# Patient Record
Sex: FEMALE | Race: WHITE | Employment: OTHER | URBAN - NONMETROPOLITAN AREA
[De-identification: names, ages, dates, MRNs, and addresses within clinical notes are randomized per-mention and may not be internally consistent; named-entity substitution may affect disease eponyms.]

---

## 2017-01-09 PROBLEM — L73.9 FOLLICULITIS: Status: ACTIVE | Noted: 2017-01-09

## 2017-01-09 PROBLEM — M85.80 OSTEOPENIA: Status: ACTIVE | Noted: 2017-01-09

## 2017-01-09 PROBLEM — E78.5 HYPERLIPIDEMIA: Status: ACTIVE | Noted: 2017-01-09

## 2017-07-10 PROBLEM — D72.819 LEUKOPENIA: Status: ACTIVE | Noted: 2017-07-10

## 2017-07-10 PROBLEM — I10 ESSENTIAL HYPERTENSION: Status: ACTIVE | Noted: 2017-07-10

## 2017-07-10 PROBLEM — Z00.00 ROUTINE GENERAL MEDICAL EXAMINATION AT A HEALTH CARE FACILITY: Status: ACTIVE | Noted: 2017-07-10

## 2018-01-09 ENCOUNTER — OFFICE VISIT (OUTPATIENT)
Dept: FAMILY MEDICINE CLINIC | Age: 78
End: 2018-01-09
Payer: MEDICARE

## 2018-01-09 VITALS
WEIGHT: 169 LBS | SYSTOLIC BLOOD PRESSURE: 134 MMHG | BODY MASS INDEX: 28.16 KG/M2 | DIASTOLIC BLOOD PRESSURE: 80 MMHG | HEIGHT: 65 IN

## 2018-01-09 DIAGNOSIS — E11.9 CONTROLLED TYPE 2 DIABETES MELLITUS WITHOUT COMPLICATION, WITHOUT LONG-TERM CURRENT USE OF INSULIN (HCC): Primary | ICD-10-CM

## 2018-01-09 DIAGNOSIS — I10 HYPERTENSION, ESSENTIAL: ICD-10-CM

## 2018-01-09 DIAGNOSIS — M85.80 OSTEOPENIA, UNSPECIFIED LOCATION: ICD-10-CM

## 2018-01-09 DIAGNOSIS — E78.49 OTHER HYPERLIPIDEMIA: ICD-10-CM

## 2018-01-09 PROCEDURE — 3044F HG A1C LEVEL LT 7.0%: CPT | Performed by: FAMILY MEDICINE

## 2018-01-09 PROCEDURE — G8417 CALC BMI ABV UP PARAM F/U: HCPCS | Performed by: FAMILY MEDICINE

## 2018-01-09 PROCEDURE — G8427 DOCREV CUR MEDS BY ELIG CLIN: HCPCS | Performed by: FAMILY MEDICINE

## 2018-01-09 PROCEDURE — 99214 OFFICE O/P EST MOD 30 MIN: CPT | Performed by: FAMILY MEDICINE

## 2018-01-09 PROCEDURE — 3017F COLORECTAL CA SCREEN DOC REV: CPT | Performed by: FAMILY MEDICINE

## 2018-01-09 PROCEDURE — 4040F PNEUMOC VAC/ADMIN/RCVD: CPT | Performed by: FAMILY MEDICINE

## 2018-01-09 PROCEDURE — G8399 PT W/DXA RESULTS DOCUMENT: HCPCS | Performed by: FAMILY MEDICINE

## 2018-01-09 PROCEDURE — 1036F TOBACCO NON-USER: CPT | Performed by: FAMILY MEDICINE

## 2018-01-09 PROCEDURE — G8484 FLU IMMUNIZE NO ADMIN: HCPCS | Performed by: FAMILY MEDICINE

## 2018-01-09 PROCEDURE — 1123F ACP DISCUSS/DSCN MKR DOCD: CPT | Performed by: FAMILY MEDICINE

## 2018-01-09 PROCEDURE — 1090F PRES/ABSN URINE INCON ASSESS: CPT | Performed by: FAMILY MEDICINE

## 2018-01-09 RX ORDER — LOSARTAN POTASSIUM AND HYDROCHLOROTHIAZIDE 12.5; 5 MG/1; MG/1
1 TABLET ORAL DAILY
Qty: 90 TABLET | Refills: 3 | Status: SHIPPED | OUTPATIENT
Start: 2018-01-09 | End: 2019-01-07 | Stop reason: SDUPTHER

## 2018-01-09 RX ORDER — CLOTRIMAZOLE AND BETAMETHASONE DIPROPIONATE 10; .64 MG/G; MG/G
CREAM TOPICAL
Qty: 30 G | Refills: 1 | Status: SHIPPED | OUTPATIENT
Start: 2018-01-09 | End: 2019-01-07 | Stop reason: SDUPTHER

## 2018-01-09 RX ORDER — ROSUVASTATIN CALCIUM 10 MG/1
TABLET, COATED ORAL
Qty: 12 TABLET | Refills: 3 | Status: SHIPPED | OUTPATIENT
Start: 2018-01-09 | End: 2018-11-07 | Stop reason: SDUPTHER

## 2018-01-09 NOTE — PROGRESS NOTES
equal, round reactive to light and accommodation, wearing glasses  Ears: normal canal and TM's. Nose: nares patent, no lesions. Oral Cavity: mucosa moist.  Throat: clear. Neck/Thyroid: neck supple, full range of motion, no cervical lymphadenopathy, no thyromegaly or carotid bruits. Skin: warm and dry. No suspicious lesions. Heart: regular rate and rhythm. No murmurs. S1, S2 normal, no gallops, rate 75  Lungs: clear to auscultation bilaterally. Abdomen: bowel sounds present, soft, nontender, nondistended, no masses or organomegaly. Musculoskeletal: normal, full range of motion in knees and hips, no swelling or tenderness. Extremities: no cyanosis or edema. Peripheral Pulses: 2+ throughout, symetric. Neurologic: nonfocal, motor strength normal upper and lower extremities, sensory exam intact. Psych: normal affect, speech fluent. Diabetic foot check: monofilament testing: normal                                       Vibratory testing: normal    ASSESSMENT:  1. Controlled type 2 diabetes mellitus without complication, without long-term current use of insulin ([facility])   DIABETES FOOT EXAM    Basic Metabolic Panel    Hemoglobin A1C    Microalbumin, Ur    Stable A1C control   2. Hypertension, essential  ALT    AST    Basic Metabolic Panel    CBC   3. Other hyperlipidemia  ALT    AST    Basic Metabolic Panel    CBC    CRP,High Sensitivity    Lipid Panel   4. Osteopenia, unspecified location  Vitamin D 25 Hydroxy       PLAN:  We discussed the diagnosis of diabetes on her problem list and whether or not this is fully accurate. She gets occasional spikes, mostly nocturnal, but most of the time stays well controlled. As long as she keeps her weight controlled and watches her carbs she should be ok. We will continue to watch this. 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 She sees Madi Holbrook and he has been working with her.     Orders Placed This Encounter   Procedures    ALT Standing Status:   Future     Standing Expiration Date:   1/9/2019    AST     Standing Status:   Future     Standing Expiration Date:   1/9/2019    Basic Metabolic Panel     Standing Status:   Future     Standing Expiration Date:   1/9/2019    CBC     Standing Status:   Future     Standing Expiration Date:   1/9/2019    CRP,High Sensitivity     Standing Status:   Future     Standing Expiration Date:   1/9/2019    Hemoglobin A1C     Standing Status:   Future     Standing Expiration Date:   1/9/2019    Lipid Panel     Standing Status:   Future     Standing Expiration Date:   1/9/2019     Order Specific Question:   Is Patient Fasting?/# of Hours     Answer:   no fasting required    Vitamin D 25 Hydroxy     Standing Status:   Future     Standing Expiration Date:   1/9/2019    Microalbumin, Ur     Standing Status:   Future     Standing Expiration Date:   1/9/2019     DIABETES FOOT EXAM     Orders Placed This Encounter   Medications    losartan-hydrochlorothiazide (HYZAAR) 50-12.5 MG per tablet     Sig: Take 1 tablet by mouth daily     Dispense:  90 tablet     Refill:  3    rosuvastatin (CRESTOR) 10 MG tablet     Sig: Once a week     Dispense:  12 tablet     Refill:  3    clotrimazole-betamethasone (LOTRISONE) 1-0.05 % cream     Sig: Apply topically 2 times daily. Dispense:  30 g     Refill:  1       Scribed by: DOUG Dave CMA    1. Controlled type 2 diabetes mellitus without complication, without long-term current use of insulin ([facility])  Stable A1C control  -  DIABETES FOOT EXAM  - Basic Metabolic Panel;  Future  - Hemoglobin A1C; Future  - Microalbumin, Ur; Future

## 2018-03-07 ENCOUNTER — OFFICE VISIT (OUTPATIENT)
Dept: FAMILY MEDICINE CLINIC | Age: 78
End: 2018-03-07
Payer: MEDICARE

## 2018-03-07 VITALS
WEIGHT: 171 LBS | BODY MASS INDEX: 28.49 KG/M2 | HEIGHT: 65 IN | SYSTOLIC BLOOD PRESSURE: 136 MMHG | DIASTOLIC BLOOD PRESSURE: 88 MMHG

## 2018-03-07 DIAGNOSIS — J30.9 ALLERGIC RHINITIS, UNSPECIFIED CHRONICITY, UNSPECIFIED SEASONALITY, UNSPECIFIED TRIGGER: Primary | ICD-10-CM

## 2018-03-07 DIAGNOSIS — J01.90 ACUTE SINUSITIS, RECURRENCE NOT SPECIFIED, UNSPECIFIED LOCATION: ICD-10-CM

## 2018-03-07 PROCEDURE — 99213 OFFICE O/P EST LOW 20 MIN: CPT | Performed by: FAMILY MEDICINE

## 2018-03-07 PROCEDURE — 3017F COLORECTAL CA SCREEN DOC REV: CPT | Performed by: FAMILY MEDICINE

## 2018-03-07 PROCEDURE — G8417 CALC BMI ABV UP PARAM F/U: HCPCS | Performed by: FAMILY MEDICINE

## 2018-03-07 PROCEDURE — 1123F ACP DISCUSS/DSCN MKR DOCD: CPT | Performed by: FAMILY MEDICINE

## 2018-03-07 PROCEDURE — 4040F PNEUMOC VAC/ADMIN/RCVD: CPT | Performed by: FAMILY MEDICINE

## 2018-03-07 PROCEDURE — G8484 FLU IMMUNIZE NO ADMIN: HCPCS | Performed by: FAMILY MEDICINE

## 2018-03-07 PROCEDURE — G8399 PT W/DXA RESULTS DOCUMENT: HCPCS | Performed by: FAMILY MEDICINE

## 2018-03-07 PROCEDURE — 1036F TOBACCO NON-USER: CPT | Performed by: FAMILY MEDICINE

## 2018-03-07 PROCEDURE — G8427 DOCREV CUR MEDS BY ELIG CLIN: HCPCS | Performed by: FAMILY MEDICINE

## 2018-03-07 PROCEDURE — 1090F PRES/ABSN URINE INCON ASSESS: CPT | Performed by: FAMILY MEDICINE

## 2018-03-07 RX ORDER — PREDNISONE 20 MG/1
20 TABLET ORAL 2 TIMES DAILY
Qty: 10 TABLET | Refills: 0 | Status: SHIPPED | OUTPATIENT
Start: 2018-03-07 | End: 2018-03-12

## 2018-03-07 RX ORDER — AMOXICILLIN AND CLAVULANATE POTASSIUM 875; 125 MG/1; MG/1
1 TABLET, FILM COATED ORAL EVERY 12 HOURS
Qty: 20 TABLET | Refills: 0 | Status: SHIPPED | OUTPATIENT
Start: 2018-03-07 | End: 2018-03-17

## 2018-03-07 ASSESSMENT — PATIENT HEALTH QUESTIONNAIRE - PHQ9
SUM OF ALL RESPONSES TO PHQ9 QUESTIONS 1 & 2: 0
2. FEELING DOWN, DEPRESSED OR HOPELESS: 0
1. LITTLE INTEREST OR PLEASURE IN DOING THINGS: 0
SUM OF ALL RESPONSES TO PHQ QUESTIONS 1-9: 0

## 2018-03-07 NOTE — PATIENT INSTRUCTIONS
PLAN:  I will treat her with Augmentin and she should get OTC Flonase  I will also send a Rx for Prednisone 20 mg BID x 5 days to use only if she doesn't improve in a few days  If she ends up needing to take it she should call and let me know next week

## 2018-03-07 NOTE — PROGRESS NOTES
equal, round reactive to light and accommodation. Ears: TMs dull bilaterally. Nose: pale mucoid  Oral Cavity: mucosa moist.  Throat: clear. Neck/Thyroid: neck supple, full range of motion, no cervical lymphadenopathy, no thyromegaly or carotid bruits. Skin: warm and dry. No suspicious lesions. Heart: regular rate and rhythm. No murmurs. S1, S2 normal, no gallops. Lungs: clear to auscultation bilaterally. Abdomen: bowel sounds present, soft, nontender, nondistended, no masses or organomegaly. Musculoskeletal: normal, full range of motion in knees and hips, no swelling or tenderness. Extremities: no cyanosis or edema. Peripheral Pulses: 2+ throughout, symetric. Neurologic: nonfocal, motor strength normal upper and lower extremities, sensory exam intact. Psych: normal affect, speech fluent. ASSESSMENT:  1. Allergic rhinitis, unspecified chronicity, unspecified seasonality, unspecified trigger     2. Acute sinusitis, recurrence not specified, unspecified location           PLAN:  I will treat her with Augmentin and she should get OTC Flonase  I will also send a Rx for Prednisone 20 mg BID x 5 days to use only if she doesn't improve in a few days  If she ends up needing to take it she should call and let me know next week    No orders of the defined types were placed in this encounter. Orders Placed This Encounter   Medications    amoxicillin-clavulanate (AUGMENTIN) 875-125 MG per tablet     Sig: Take 1 tablet by mouth every 12 hours for 10 days     Dispense:  20 tablet     Refill:  0    predniSONE (DELTASONE) 20 MG tablet     Sig: Take 1 tablet by mouth 2 times daily for 5 days     Dispense:  10 tablet     Refill:  0       The documentation recorded by the scribe accurately reflects the services I personally performed and the decisions made by me.  Glady Schwab, MD

## 2018-07-02 LAB
ABSOLUTE BASO #: 0 K/UL (ref 0–0.1)
ABSOLUTE EOS #: 0.1 K/UL (ref 0.1–0.4)
ABSOLUTE LYMPH #: 1.8 K/UL (ref 0.8–5.2)
ABSOLUTE MONO #: 0.3 K/UL (ref 0.1–0.9)
ABSOLUTE NEUT #: 2.1 K/UL (ref 1.3–9.1)
ALT SERPL-CCNC: 20 U/L (ref 5–40)
ANION GAP SERPL CALCULATED.3IONS-SCNC: 13 MEQ/L (ref 10–19)
AST SERPL-CCNC: 17 U/L (ref 9–40)
BASOPHILS RELATIVE PERCENT: 0.5 %
BUN BLDV-MCNC: 16 MG/DL (ref 8–23)
CALCIUM SERPL-MCNC: 9.6 MG/DL (ref 8.5–10.5)
CHLORIDE BLD-SCNC: 102 MEQ/L (ref 95–107)
CHOLESTEROL/HDL RATIO: 4.4
CHOLESTEROL: 213 MG/DL
CO2: 27 MEQ/L (ref 19–31)
CREAT SERPL-MCNC: 0.8 MG/DL (ref 0.6–1.3)
EGFR AFRICAN AMERICAN: 83.6 ML/MIN/1.73 M2
EGFR IF NONAFRICAN AMERICAN: 72.1 ML/MIN/1.73 M2
EOSINOPHILS RELATIVE PERCENT: 1.6 %
GLUCOSE: 139 MG/DL (ref 70–99)
HCT VFR BLD CALC: 40.1 % (ref 36–48)
HDLC SERPL-MCNC: 48 MG/DL
HEMOGLOBIN: 13.8 G/DL (ref 12–16)
HIGH SENSITIVE C-REACTIVE PROTEIN: 0.61 MG/L
LDL CHOLESTEROL CALCULATED: 121 MG/DL
LDL/HDL RATIO: 2.5
LYMPHOCYTE %: 41.6 %
MCH RBC QN AUTO: 30.6 PG (ref 27–34)
MCHC RBC AUTO-ENTMCNC: 34.4 G/DL (ref 31–36)
MCV RBC AUTO: 88.9 FL (ref 80–100)
MONOCYTES # BLD: 7.2 %
NEUTROPHILS RELATIVE PERCENT: 48.9 %
PDW BLD-RTO: 13.2 % (ref 10.8–14.8)
PLATELETS: 211 K/UL (ref 150–450)
POTASSIUM SERPL-SCNC: 3.9 MEQ/L (ref 3.5–5.4)
RBC: 4.51 M/UL (ref 4–5.5)
SODIUM BLD-SCNC: 142 MEQ/L (ref 135–146)
TRIGL SERPL-MCNC: 222 MG/DL
VLDLC SERPL CALC-MCNC: 44 MG/DL
WBC: 4.3 K/UL (ref 3.7–10.8)

## 2018-07-03 LAB
AVERAGE GLUCOSE: 123 MG/DL (ref 66–114)
CREATINE, URINE: 182.7 MG/DL (ref 28–217)
HBA1C MFR BLD: 5.9 % (ref 4.2–5.8)
MICROALBUMIN/CREAT 24H UR: <12 MG/DL
MICROALBUMIN/CREAT UR-RTO: NORMAL MG/G

## 2018-07-04 LAB — VITAMIN D 25-HYDROXY: 38 NG/ML

## 2018-07-09 ENCOUNTER — OFFICE VISIT (OUTPATIENT)
Dept: FAMILY MEDICINE CLINIC | Age: 78
End: 2018-07-09
Payer: MEDICARE

## 2018-07-09 VITALS
DIASTOLIC BLOOD PRESSURE: 76 MMHG | WEIGHT: 172.6 LBS | HEIGHT: 65 IN | BODY MASS INDEX: 28.76 KG/M2 | SYSTOLIC BLOOD PRESSURE: 138 MMHG

## 2018-07-09 DIAGNOSIS — H60.509 ACUTE OTITIS EXTERNA, UNSPECIFIED LATERALITY, UNSPECIFIED TYPE: ICD-10-CM

## 2018-07-09 DIAGNOSIS — E11.9 CONTROLLED TYPE 2 DIABETES MELLITUS WITHOUT COMPLICATION, WITHOUT LONG-TERM CURRENT USE OF INSULIN (HCC): Primary | ICD-10-CM

## 2018-07-09 DIAGNOSIS — Z12.31 SCREENING MAMMOGRAM, ENCOUNTER FOR: ICD-10-CM

## 2018-07-09 DIAGNOSIS — E78.49 OTHER HYPERLIPIDEMIA: ICD-10-CM

## 2018-07-09 DIAGNOSIS — I10 HYPERTENSION, ESSENTIAL: ICD-10-CM

## 2018-07-09 DIAGNOSIS — M85.80 OSTEOPENIA, UNSPECIFIED LOCATION: ICD-10-CM

## 2018-07-09 PROCEDURE — 3017F COLORECTAL CA SCREEN DOC REV: CPT | Performed by: FAMILY MEDICINE

## 2018-07-09 PROCEDURE — 99214 OFFICE O/P EST MOD 30 MIN: CPT | Performed by: FAMILY MEDICINE

## 2018-07-09 PROCEDURE — G8399 PT W/DXA RESULTS DOCUMENT: HCPCS | Performed by: FAMILY MEDICINE

## 2018-07-09 PROCEDURE — 1036F TOBACCO NON-USER: CPT | Performed by: FAMILY MEDICINE

## 2018-07-09 PROCEDURE — 3044F HG A1C LEVEL LT 7.0%: CPT | Performed by: FAMILY MEDICINE

## 2018-07-09 PROCEDURE — 1090F PRES/ABSN URINE INCON ASSESS: CPT | Performed by: FAMILY MEDICINE

## 2018-07-09 PROCEDURE — 1101F PT FALLS ASSESS-DOCD LE1/YR: CPT | Performed by: FAMILY MEDICINE

## 2018-07-09 PROCEDURE — 2022F DILAT RTA XM EVC RTNOPTHY: CPT | Performed by: FAMILY MEDICINE

## 2018-07-09 PROCEDURE — 4130F TOPICAL PREP RX AOE: CPT | Performed by: FAMILY MEDICINE

## 2018-07-09 PROCEDURE — 4040F PNEUMOC VAC/ADMIN/RCVD: CPT | Performed by: FAMILY MEDICINE

## 2018-07-09 PROCEDURE — 1123F ACP DISCUSS/DSCN MKR DOCD: CPT | Performed by: FAMILY MEDICINE

## 2018-07-09 PROCEDURE — G8427 DOCREV CUR MEDS BY ELIG CLIN: HCPCS | Performed by: FAMILY MEDICINE

## 2018-07-09 PROCEDURE — G8417 CALC BMI ABV UP PARAM F/U: HCPCS | Performed by: FAMILY MEDICINE

## 2018-07-09 RX ORDER — AMLODIPINE BESYLATE 5 MG/1
5 TABLET ORAL DAILY
Qty: 90 TABLET | Refills: 3 | Status: SHIPPED | OUTPATIENT
Start: 2018-07-09 | End: 2019-07-08 | Stop reason: SDUPTHER

## 2018-07-09 NOTE — PROGRESS NOTES
Remy LIND RMA, am scribing for and in the presence of Dr. Satish Nieto. 07/09/18 8:40 am [address] [facility]  [phone]  Dept: [phone]    Ivette Ro is a 78 y.o. female here for 6 Month Follow-Up; Hypertension; Hyperlipidemia; and Diabetes      HPI:  HYPERTENSION:  Medication compliance: compliant all of the time. Medication Therapy: amlodipine, losartan-HCTZ, Metoprolol  She is exercising, for 3 hours per week. She is adherent to a low-sodium diet. Blood pressure is being monitored at home, average readings are 120-130/70-80. Patient reports that She has limited alcohol intake. Does the patient have sleep apnea? She has not been tested  The patient's most recent LDL is below 190, which was checked on 07/02/18. HYPERLIPIDEMIA:  Medication compliance: compliant all of the time. Medication Therapy: rosuvastatin (Crestor) and Aspirin, fish oil  Patient is  following a low fat, low cholesterol diet. She is  exercising regularly, for 3 hours per week.     DIABETES MELLITUS  Medication compliance:  n/a  Diabetic diet compliance:  compliant all of the time  Current exercise: yoga and walking  How long are you exercising during the week? 3+  Frequency of testing: none  Any episodes of hypoglycemia? no  Eye exam current (within one year): yes, \"about 6 months ago\" Dr. Kenyetta Gil  Diabetic foot check in the past year Yes, 01/2018   reports that she quit smoking about 32 years ago. She has never used smokeless tobacco.     Prior to Admission medications    Medication Sig Start Date End Date Taking?  Authorizing Provider   amLODIPine (NORVASC) 5 MG tablet Take 1 tablet by mouth daily 7/9/18  Yes Satish Nieto MD   metoprolol tartrate (LOPRESSOR) 25 MG tablet Take 1 tablet by mouth daily 7/9/18  Yes Satish Nieto MD   losartan-hydrochlorothiazide [facility]) 50-12.5 MG per tablet Take 1 tablet by mouth daily 1/9/18  Yes Satish Nieto MD Encounters:   07/09/18 138/76   03/07/18 136/88   01/09/18 134/80       General Appearance: in no acute distress, well developed, well nourished. Eyes: pupils equal, round reactive to light and accommodation. Wearing glasses  Ears: normal canal and TM's. Nose: nares patent, no lesions. Oral Cavity: mucosa moist.  Throat: clear. Neck/Thyroid: neck supple, full range of motion, no cervical lymphadenopathy, no thyromegaly or carotid bruits. Skin: warm and dry. No suspicious lesions. Heart: regular rate and rhythm. No murmurs. S1, S2 normal, no gallops. Rate: 70   Lungs: clear to auscultation bilaterally. Abdomen: bowel sounds present, soft, nontender, nondistended, no masses or organomegaly. Musculoskeletal: normal, full range of motion in knees and hips, no swelling or tenderness. Extremities: no cyanosis or edema. Peripheral Pulses: 2+ throughout, symetric. Neurologic: nonfocal, motor strength normal upper and lower extremities, sensory exam intact. Psych: normal affect, speech fluent. ASSESSMENT:   Diagnosis Orders   1. Controlled type 2 diabetes mellitus without complication, without long-term current use of insulin (HCC)  Hemoglobin A1C   2. Hypertension, essential     3. Osteopenia, unspecified location     4. Other hyperlipidemia  ALT    AST    Basic Metabolic Panel    CBC    CRP,High Sensitivity   5. Screening mammogram, encounter for  DAVID DIGITAL SCREEN W CAD BILATERAL   6. Acute otitis externa, unspecified laterality, unspecified type      recurrent       PLAN:  Her labs are reviewed. Her hemoglobin A1C level has creeped up slightly from where it was to 5.9. I encourage her to continue to watch her carbohydrate intake in order prevent this from creeping up over 6. I am pleased with all other lab results. She continues to stay active, golfing. I commend her on this We discuss her level of fatigue with walking the course and how this has changed over the years as she has gotten older.  I

## 2018-08-06 ENCOUNTER — HOSPITAL ENCOUNTER (OUTPATIENT)
Dept: WOMENS IMAGING | Age: 78
Discharge: HOME | End: 2018-08-08
Payer: MEDICARE

## 2018-08-06 DIAGNOSIS — Z12.31 SCREENING MAMMOGRAM, ENCOUNTER FOR: ICD-10-CM

## 2018-08-06 PROCEDURE — 77067 SCR MAMMO BI INCL CAD: CPT

## 2018-08-08 PROBLEM — Z12.31 SCREENING MAMMOGRAM, ENCOUNTER FOR: Status: RESOLVED | Noted: 2018-07-09 | Resolved: 2018-08-08

## 2018-08-10 ENCOUNTER — TELEPHONE (OUTPATIENT)
Dept: FAMILY MEDICINE CLINIC | Age: 78
End: 2018-08-10

## 2018-08-10 DIAGNOSIS — R92.8 ABNORMAL MAMMOGRAM OF RIGHT BREAST: Primary | ICD-10-CM

## 2018-08-10 NOTE — TELEPHONE ENCOUNTER
Is it ok to order diagnostic mammo and ultrasound d/t abnormal mammo?   See scanned result in     Health Maintenance   Topic Date Due    Diabetic retinal exam  09/08/1950    DTaP/Tdap/Td vaccine (1 - Tdap) 09/08/1961    Shingles Vaccine (1 of 2 - 2 Dose Series) 09/08/1992    Flu vaccine (1) 09/01/2018    Diabetic foot exam  01/09/2019    A1C test (Diabetic or Prediabetic)  07/02/2019    Lipid screen  07/02/2019    Potassium monitoring  07/02/2019    Creatinine monitoring  07/02/2019    Colon cancer screen colonoscopy  02/07/2023    DEXA (modify frequency per FRAX score)  Completed    Pneumococcal low/med risk  Completed             (applicable per patient's age: Cancer Screenings, Depression Screening, Fall Risk Screening, Immunizations)    Hemoglobin A1C (%)   Date Value   07/02/2018 5.9 (H)   01/04/2018 5.8   07/05/2017 5.7     LDL Cholesterol (mg/dL)   Date Value   07/06/2016 122     LDL Calculated (mg/dL)   Date Value   07/02/2018 121     AST (U/L)   Date Value   07/02/2018 17     ALT (U/L)   Date Value   07/02/2018 20     BUN (mg/dL)   Date Value   07/02/2018 16      (goal A1C is < 7)   (goal LDL is <100) need 30-50% reduction from baseline     BP Readings from Last 3 Encounters:   07/09/18 138/76   03/07/18 136/88   01/09/18 134/80    (goal /80)      All Future Testing planned in [facility]:  Lab Frequency Next Occurrence   ALT Once 07/02/2018   AST Once 90/26/0766   Basic Metabolic Panel Once 13/86/8891   CBC Once 07/02/2018   CRP,High Sensitivity Once 07/02/2018   Hemoglobin A1C Once 07/02/2018   Lipid Panel Once 07/02/2018   Vitamin D 25 Hydroxy Once 07/02/2018   Microalbumin, Ur Once 07/02/2018   ALT Once 12/31/2018   AST Once 68/24/2207   Basic Metabolic Panel Once 10/99/6988   CBC Once 12/31/2018   CRP,High Sensitivity Once 12/31/2018   Hemoglobin A1C Once 12/31/2018       Next Visit Date:  Future Appointments  Date Time Provider Raya Chan   1/7/2019 8:30 AM [facility] Mat[facility]            Patient Active Problem List:     Hypertension, essential     DM II (diabetes mellitus, type II), controlled (HCC)     Allergic rhinitis     Osteopenia     Hyperlipidemia     Folliculitis     Essential hypertension     Leukopenia     Routine general medical examination at a health care facility     Acute sinusitis     Acute otitis externa

## 2018-08-20 ENCOUNTER — HOSPITAL ENCOUNTER (OUTPATIENT)
Dept: ULTRASOUND IMAGING | Age: 78
Discharge: HOME | End: 2018-08-22
Payer: MEDICARE

## 2018-08-20 ENCOUNTER — HOSPITAL ENCOUNTER (OUTPATIENT)
Dept: WOMENS IMAGING | Age: 78
Discharge: HOME | End: 2018-08-22
Payer: MEDICARE

## 2018-08-20 DIAGNOSIS — R92.8 ABNORMAL MAMMOGRAM OF RIGHT BREAST: ICD-10-CM

## 2018-08-20 PROCEDURE — 77065 DX MAMMO INCL CAD UNI: CPT

## 2018-08-20 PROCEDURE — 76641 ULTRASOUND BREAST COMPLETE: CPT

## 2018-09-26 PROBLEM — Z00.00 ROUTINE GENERAL MEDICAL EXAMINATION AT A HEALTH CARE FACILITY: Status: RESOLVED | Noted: 2017-07-10 | Resolved: 2018-09-26

## 2018-11-07 RX ORDER — ROSUVASTATIN CALCIUM 10 MG/1
TABLET, COATED ORAL
Qty: 13 TABLET | Refills: 3 | Status: SHIPPED | OUTPATIENT
Start: 2018-11-07 | End: 2019-07-08 | Stop reason: SDUPTHER

## 2019-01-02 LAB
ABSOLUTE BASO #: 0 K/UL (ref 0–0.1)
ABSOLUTE EOS #: 0.1 K/UL (ref 0.1–0.4)
ABSOLUTE LYMPH #: 2.3 K/UL (ref 0.8–5.2)
ABSOLUTE MONO #: 0.4 K/UL (ref 0.1–0.9)
ABSOLUTE NEUT #: 1.5 K/UL (ref 1.3–9.1)
BASOPHILS RELATIVE PERCENT: 0.7 %
EOSINOPHILS RELATIVE PERCENT: 1.9 %
HCT VFR BLD CALC: 39.7 % (ref 36–48)
HEMOGLOBIN: 13.6 G/DL (ref 12–16)
LYMPHOCYTE %: 53.3 %
MCH RBC QN AUTO: 30.5 PG (ref 27–34)
MCHC RBC AUTO-ENTMCNC: 34.3 G/DL (ref 31–36)
MCV RBC AUTO: 89 FL (ref 80–100)
MONOCYTES # BLD: 8.8 %
NEUTROPHILS RELATIVE PERCENT: 35.3 %
PDW BLD-RTO: 13.3 % (ref 10.8–14.8)
PLATELETS: 187 K/UL (ref 150–450)
RBC: 4.46 M/UL (ref 4–5.5)
WBC: 4.2 K/UL (ref 3.7–10.8)

## 2019-01-03 LAB
ALT SERPL-CCNC: 21 U/L (ref 5–40)
ANION GAP SERPL CALCULATED.3IONS-SCNC: 12 MEQ/L (ref 10–19)
AST SERPL-CCNC: 21 U/L (ref 9–40)
AVERAGE GLUCOSE: 117 MG/DL (ref 66–114)
BUN BLDV-MCNC: 17 MG/DL (ref 8–23)
CALCIUM SERPL-MCNC: 9.9 MG/DL (ref 8.5–10.5)
CHLORIDE BLD-SCNC: 103 MEQ/L (ref 95–107)
CO2: 28 MEQ/L (ref 19–31)
CREAT SERPL-MCNC: 0.9 MG/DL (ref 0.6–1.3)
EGFR AFRICAN AMERICAN: 72 ML/MIN/1.73 M2
EGFR IF NONAFRICAN AMERICAN: 62.1 ML/MIN/1.73 M2
GLUCOSE: 116 MG/DL (ref 70–99)
HBA1C MFR BLD: 5.7 %
HIGH SENSITIVE C-REACTIVE PROTEIN: 1.74 MG/L
POTASSIUM SERPL-SCNC: 4 MEQ/L (ref 3.5–5.4)
SODIUM BLD-SCNC: 143 MEQ/L (ref 135–146)

## 2019-01-07 ENCOUNTER — OFFICE VISIT (OUTPATIENT)
Dept: FAMILY MEDICINE CLINIC | Age: 79
End: 2019-01-07
Payer: MEDICARE

## 2019-01-07 VITALS
SYSTOLIC BLOOD PRESSURE: 136 MMHG | DIASTOLIC BLOOD PRESSURE: 78 MMHG | WEIGHT: 172 LBS | HEIGHT: 65 IN | BODY MASS INDEX: 28.66 KG/M2

## 2019-01-07 DIAGNOSIS — I10 HYPERTENSION, ESSENTIAL: ICD-10-CM

## 2019-01-07 DIAGNOSIS — H60.60 CHRONIC OTITIS EXTERNA, UNSPECIFIED LATERALITY, UNSPECIFIED TYPE: ICD-10-CM

## 2019-01-07 DIAGNOSIS — E78.49 OTHER HYPERLIPIDEMIA: ICD-10-CM

## 2019-01-07 DIAGNOSIS — E11.9 CONTROLLED TYPE 2 DIABETES MELLITUS WITHOUT COMPLICATION, WITHOUT LONG-TERM CURRENT USE OF INSULIN (HCC): Primary | ICD-10-CM

## 2019-01-07 PROCEDURE — G8399 PT W/DXA RESULTS DOCUMENT: HCPCS | Performed by: FAMILY MEDICINE

## 2019-01-07 PROCEDURE — 1036F TOBACCO NON-USER: CPT | Performed by: FAMILY MEDICINE

## 2019-01-07 PROCEDURE — G8484 FLU IMMUNIZE NO ADMIN: HCPCS | Performed by: FAMILY MEDICINE

## 2019-01-07 PROCEDURE — G8427 DOCREV CUR MEDS BY ELIG CLIN: HCPCS | Performed by: FAMILY MEDICINE

## 2019-01-07 PROCEDURE — 4040F PNEUMOC VAC/ADMIN/RCVD: CPT | Performed by: FAMILY MEDICINE

## 2019-01-07 PROCEDURE — G8417 CALC BMI ABV UP PARAM F/U: HCPCS | Performed by: FAMILY MEDICINE

## 2019-01-07 PROCEDURE — 1123F ACP DISCUSS/DSCN MKR DOCD: CPT | Performed by: FAMILY MEDICINE

## 2019-01-07 PROCEDURE — 1090F PRES/ABSN URINE INCON ASSESS: CPT | Performed by: FAMILY MEDICINE

## 2019-01-07 PROCEDURE — 1101F PT FALLS ASSESS-DOCD LE1/YR: CPT | Performed by: FAMILY MEDICINE

## 2019-01-07 PROCEDURE — 99214 OFFICE O/P EST MOD 30 MIN: CPT | Performed by: FAMILY MEDICINE

## 2019-01-07 RX ORDER — LOSARTAN POTASSIUM AND HYDROCHLOROTHIAZIDE 12.5; 5 MG/1; MG/1
1 TABLET ORAL DAILY
Qty: 90 TABLET | Refills: 3 | Status: SHIPPED | OUTPATIENT
Start: 2019-01-07 | End: 2020-01-06 | Stop reason: SDUPTHER

## 2019-01-07 RX ORDER — CLOTRIMAZOLE AND BETAMETHASONE DIPROPIONATE 10; .64 MG/G; MG/G
CREAM TOPICAL
Qty: 30 G | Refills: 1 | Status: SHIPPED | OUTPATIENT
Start: 2019-01-07 | End: 2020-01-06 | Stop reason: SDUPTHER

## 2019-01-07 ASSESSMENT — PATIENT HEALTH QUESTIONNAIRE - PHQ9
SUM OF ALL RESPONSES TO PHQ9 QUESTIONS 1 & 2: 0
SUM OF ALL RESPONSES TO PHQ QUESTIONS 1-9: 0
SUM OF ALL RESPONSES TO PHQ QUESTIONS 1-9: 0
1. LITTLE INTEREST OR PLEASURE IN DOING THINGS: 0
2. FEELING DOWN, DEPRESSED OR HOPELESS: 0

## 2019-07-03 LAB
ALT SERPL-CCNC: 20 U/L (ref 0–31)
ANION GAP SERPL CALCULATED.3IONS-SCNC: 8 MMOL/L (ref 4–12)
AST SERPL-CCNC: 19 U/L (ref 0–41)
BUN BLDV-MCNC: 15 MG/DL (ref 5–27)
CALCIUM SERPL-MCNC: 9.8 MG/DL (ref 8.5–10.5)
CHLORIDE BLD-SCNC: 105 MMOL/L (ref 98–109)
CHOLESTEROL/HDL RATIO: 4 (ref 1–5)
CHOLESTEROL: 209 MG/DL (ref 150–200)
CO2: 30 MMOL/L (ref 22–32)
CREAT SERPL-MCNC: 0.78 MG/DL (ref 0.4–1)
EGFR AFRICAN AMERICAN: >60 ML/MIN/1.73SQ.M
EGFR IF NONAFRICAN AMERICAN: >60 ML/MIN/1.73SQ.M
GLUCOSE: 133 MG/DL (ref 65–99)
HDLC SERPL-MCNC: 52 MG/DL
HIGH SENSITIVE C-REACTIVE PROTEIN: 0.1 MG/DL (ref 0–0.74)
LDL CHOLESTEROL CALCULATED: 122 MG/DL
LDL/HDL RATIO: 2.3
POTASSIUM SERPL-SCNC: 3.9 MMOL/L (ref 3.5–5)
SODIUM BLD-SCNC: 143 MMOL/L (ref 134–146)
TRIGL SERPL-MCNC: 173 MG/DL (ref 27–150)
VLDLC SERPL CALC-MCNC: 35 MG/DL (ref 0–30)

## 2019-07-04 LAB
ABSOLUTE BASO #: 0 K/UL (ref 0–0.1)
ABSOLUTE EOS #: 0.1 K/UL (ref 0.1–0.4)
ABSOLUTE LYMPH #: 2.1 K/UL (ref 0.8–5.2)
ABSOLUTE MONO #: 0.4 K/UL (ref 0.1–0.9)
ABSOLUTE NEUT #: 2.1 K/UL (ref 1.3–9.1)
AVERAGE GLUCOSE: 123 MG/DL (ref 66–114)
BASOPHILS RELATIVE PERCENT: 0.6 %
EOSINOPHILS RELATIVE PERCENT: 1.5 %
HBA1C MFR BLD: 5.9 %
HCT VFR BLD CALC: 40.7 % (ref 36–48)
HEMOGLOBIN: 13.9 G/DL (ref 12–16)
LYMPHOCYTE %: 44.5 %
MCH RBC QN AUTO: 31.3 PG (ref 27–34)
MCHC RBC AUTO-ENTMCNC: 34.2 G/DL (ref 31–36)
MCV RBC AUTO: 91.7 FL (ref 80–100)
MONOCYTES # BLD: 8.6 %
NEUTROPHILS RELATIVE PERCENT: 44.4 %
PDW BLD-RTO: 13.3 % (ref 10.8–14.8)
PLATELETS: 193 K/UL (ref 150–450)
RBC: 4.44 M/UL (ref 4–5.5)
WBC: 4.6 K/UL (ref 3.7–10.8)

## 2019-07-08 ENCOUNTER — OFFICE VISIT (OUTPATIENT)
Dept: FAMILY MEDICINE CLINIC | Age: 79
End: 2019-07-08
Payer: MEDICARE

## 2019-07-08 VITALS
SYSTOLIC BLOOD PRESSURE: 130 MMHG | WEIGHT: 172.8 LBS | HEIGHT: 65 IN | BODY MASS INDEX: 28.79 KG/M2 | DIASTOLIC BLOOD PRESSURE: 78 MMHG

## 2019-07-08 DIAGNOSIS — L71.9 ROSACEA: ICD-10-CM

## 2019-07-08 DIAGNOSIS — E11.9 CONTROLLED TYPE 2 DIABETES MELLITUS WITHOUT COMPLICATION, WITHOUT LONG-TERM CURRENT USE OF INSULIN (HCC): Primary | ICD-10-CM

## 2019-07-08 DIAGNOSIS — E78.5 HYPERLIPIDEMIA, UNSPECIFIED HYPERLIPIDEMIA TYPE: ICD-10-CM

## 2019-07-08 DIAGNOSIS — M85.80 OSTEOPENIA, UNSPECIFIED LOCATION: ICD-10-CM

## 2019-07-08 DIAGNOSIS — E55.9 VITAMIN D DEFICIENCY: ICD-10-CM

## 2019-07-08 DIAGNOSIS — I10 ESSENTIAL HYPERTENSION: ICD-10-CM

## 2019-07-08 PROCEDURE — 1036F TOBACCO NON-USER: CPT | Performed by: FAMILY MEDICINE

## 2019-07-08 PROCEDURE — G8427 DOCREV CUR MEDS BY ELIG CLIN: HCPCS | Performed by: FAMILY MEDICINE

## 2019-07-08 PROCEDURE — G8417 CALC BMI ABV UP PARAM F/U: HCPCS | Performed by: FAMILY MEDICINE

## 2019-07-08 PROCEDURE — 4040F PNEUMOC VAC/ADMIN/RCVD: CPT | Performed by: FAMILY MEDICINE

## 2019-07-08 PROCEDURE — 1123F ACP DISCUSS/DSCN MKR DOCD: CPT | Performed by: FAMILY MEDICINE

## 2019-07-08 PROCEDURE — 1090F PRES/ABSN URINE INCON ASSESS: CPT | Performed by: FAMILY MEDICINE

## 2019-07-08 PROCEDURE — 99214 OFFICE O/P EST MOD 30 MIN: CPT | Performed by: FAMILY MEDICINE

## 2019-07-08 PROCEDURE — G8399 PT W/DXA RESULTS DOCUMENT: HCPCS | Performed by: FAMILY MEDICINE

## 2019-07-08 RX ORDER — ROSUVASTATIN CALCIUM 10 MG/1
TABLET, COATED ORAL
Qty: 13 TABLET | Refills: 3 | Status: SHIPPED | OUTPATIENT
Start: 2019-07-08 | End: 2020-01-06 | Stop reason: SDUPTHER

## 2019-07-08 RX ORDER — AMLODIPINE BESYLATE 5 MG/1
5 TABLET ORAL DAILY
Qty: 90 TABLET | Refills: 3 | Status: SHIPPED | OUTPATIENT
Start: 2019-07-08 | End: 2020-01-06 | Stop reason: SDUPTHER

## 2019-07-08 NOTE — PROGRESS NOTES
facility-administered medications for this visit. No Known Allergies    PHYSICAL EXAM:    /78 (Site: Left Upper Arm, Position: Sitting, Cuff Size: Medium Adult)   Ht 5' 5\" (1.651 m)   Wt 172 lb 12.8 oz (78.4 kg)   BMI 28.76 kg/m²   Wt Readings from Last 3 Encounters:   07/08/19 172 lb 12.8 oz (78.4 kg)   01/07/19 172 lb (78 kg)   07/09/18 172 lb 9.6 oz (78.3 kg)     BP Readings from Last 3 Encounters:   07/08/19 130/78   01/07/19 136/78   07/09/18 138/76       General Appearance: in no acute distress, well developed, well nourished. Eyes: pupils equal, round reactive to light and accommodation. Wearing glasses  Ears: normal canal and TM's. She uses maxitrol drops to her right ear PRN  Nose: nares patent, no lesions. Oral Cavity: mucosa moist.  Throat: clear. Neck/Thyroid: neck supple, full range of motion, no cervical lymphadenopathy, no thyromegaly or carotid bruits. Skin: warm and dry. No suspicious lesions. Heart: regular rate and rhythm. No murmurs. S1, S2 normal, no gallops. Rate 70  Lungs: clear to auscultation bilaterally. Abdomen: bowel sounds present, soft, nontender, nondistended, no masses or organomegaly. Musculoskeletal: normal, full range of motion in knees and hips, no swelling or tenderness. Extremities: no cyanosis or edema. Peripheral Pulses: 2+ throughout, symetric. Neurologic: nonfocal, motor strength normal upper and lower extremities, sensory exam intact. Psych: normal affect, speech fluent. ASSESSMENT:   Diagnosis Orders   1. Controlled type 2 diabetes mellitus without complication, without long-term current use of insulin (HCC)  Hemoglobin A1C   2. Osteopenia, unspecified location     3. Hyperlipidemia, unspecified hyperlipidemia type  ALT    AST    Basic Metabolic Panel    CBC    CRP,High Sensitivity    Lipid Panel    T4    TSH without Reflex   4. Essential hypertension     5. Rosacea     6.  Vitamin D deficiency  Vitamin D 25 Hydroxy       PLAN:  She did see Dr. Maria Isabel Lynn and had an excision on her right arm. All of her labs look pretty good. She currently taking crestor once a week. It wouldn't be a bad idea to take it twice a week or MWF. Her highest A1C was in 2012, she has been able to keep her number below 6.0. Looking at her DEXA scan from 2016, she is osteopenic, she currently is not taking anything for her bones, I recommend she take 1000 units of Vitamin D once a day. I will check her levels at her next 6 month visit.      Orders Placed This Encounter   Procedures    ALT     Standing Status:   Future     Standing Expiration Date:   7/7/2020    AST     Standing Status:   Future     Standing Expiration Date:   7/7/2020    Basic Metabolic Panel     Standing Status:   Future     Standing Expiration Date:   7/7/2020    CBC     Standing Status:   Future     Standing Expiration Date:   7/7/2020   [facility] CRP,High Sensitivity     Standing Status:   Future     Standing Expiration Date:   7/7/2020    Hemoglobin A1C     Standing Status:   Future     Standing Expiration Date:   7/7/2020    Lipid Panel     Standing Status:   Future     Standing Expiration Date:   7/7/2020     Order Specific Question:   Is Patient Fasting?/# of Hours     Answer:   no fasting    T4     Standing Status:   Future     Standing Expiration Date:   7/7/2020    TSH without Reflex     Standing Status:   Future     Standing Expiration Date:   7/7/2020    Vitamin D 25 Hydroxy     Standing Status:   Future     Standing Expiration Date:   7/7/2020     Orders Placed This Encounter   Medications    rosuvastatin (CRESTOR) 10 MG tablet     Sig: TAKE 1 TABLET BY MOUTH ONCE A WEEK     Dispense:  13 tablet     Refill:  3    metoprolol tartrate (LOPRESSOR) 25 MG tablet     Sig: Take 1 tablet by mouth daily     Dispense:  90 tablet     Refill:  3    amLODIPine (NORVASC) 5 MG tablet     Sig: Take 1 tablet by mouth daily     Dispense:  90 tablet     Refill:  3    metroNIDAZOLE (METROCREAM) 0.75 %

## 2019-12-23 LAB
ABSOLUTE BASO #: 0 X10E9/L (ref 0–0.9)
ABSOLUTE EOS #: 0.1 X10E9/L (ref 0–0.4)
ABSOLUTE LYMPH #: 2.1 X10E9/L (ref 1–3.5)
ABSOLUTE MONO #: 0.4 X10E9/L (ref 0–0.9)
ABSOLUTE NEUT #: 1.9 X10E9/L (ref 1.5–6.6)
ALT SERPL-CCNC: 27 U/L (ref 0–31)
ANION GAP SERPL CALCULATED.3IONS-SCNC: 10 MMOL/L (ref 4–12)
AST SERPL-CCNC: 24 U/L (ref 0–41)
AVERAGE GLUCOSE: 117 MG/DL
BASOPHILS RELATIVE PERCENT: 0.6 %
BUN BLDV-MCNC: 14 MG/DL (ref 5–27)
CALCIUM SERPL-MCNC: 9.8 MG/DL (ref 8.5–10.5)
CHLORIDE BLD-SCNC: 105 MMOL/L (ref 98–109)
CHOLESTEROL/HDL RATIO: 4.4 (ref 1–5)
CHOLESTEROL: 212 MG/DL (ref 150–200)
CO2: 27 MMOL/L (ref 22–32)
CREAT SERPL-MCNC: 0.83 MG/DL (ref 0.4–1)
EGFR AFRICAN AMERICAN: >60 ML/MIN/1.73SQ.M
EGFR IF NONAFRICAN AMERICAN: >60 ML/MIN/1.73SQ.M
EOSINOPHILS RELATIVE PERCENT: 1.8 %
GLUCOSE: 128 MG/DL (ref 65–99)
HBA1C MFR BLD: 5.7 % (ref 4.4–6.4)
HCT VFR BLD CALC: 40.2 % (ref 34–48)
HDLC SERPL-MCNC: 48 MG/DL
HEMOGLOBIN: 13.9 G/DL (ref 11.7–16.1)
LDL CHOLESTEROL CALCULATED: 123 MG/DL
LDL/HDL RATIO: 2.6
LYMPHOCYTE %: 46.7 %
MCH RBC QN AUTO: 32.1 PG (ref 27–35)
MCHC RBC AUTO-ENTMCNC: 34.7 G/DL (ref 31–36)
MCV RBC AUTO: 93 FL (ref 81–101)
MONOCYTES # BLD: 8.2 %
NEUTROPHILS RELATIVE PERCENT: 42.7 %
PDW BLD-RTO: 14 % (ref 11.5–14.7)
PLATELETS: 194 X10E9/L (ref 150–450)
PMV BLD AUTO: 7.3 FL (ref 7–12)
POTASSIUM SERPL-SCNC: 3.9 MMOL/L (ref 3.5–5)
RBC: 4.34 X10E12/L (ref 3.3–5.5)
SODIUM BLD-SCNC: 142 MMOL/L (ref 134–146)
T4 TOTAL: 7.7 UG/DL (ref 6.1–12.2)
TRIGL SERPL-MCNC: 203 MG/DL (ref 27–150)
TSH SERPL DL<=0.05 MIU/L-ACNC: 3.27 UIU/ML (ref 0.49–4.67)
VITAMIN D 25-HYDROXY: 52.6 NG/ML (ref 30–100)
VLDLC SERPL CALC-MCNC: 41 MG/DL (ref 0–30)
WBC: 4.5 X10E9/L (ref 4–11.8)

## 2020-01-06 ENCOUNTER — OFFICE VISIT (OUTPATIENT)
Dept: FAMILY MEDICINE CLINIC | Age: 80
End: 2020-01-06
Payer: MEDICARE

## 2020-01-06 VITALS
HEIGHT: 65 IN | SYSTOLIC BLOOD PRESSURE: 124 MMHG | BODY MASS INDEX: 28.86 KG/M2 | WEIGHT: 173.2 LBS | DIASTOLIC BLOOD PRESSURE: 72 MMHG

## 2020-01-06 PROCEDURE — 4130F TOPICAL PREP RX AOE: CPT | Performed by: FAMILY MEDICINE

## 2020-01-06 PROCEDURE — 1123F ACP DISCUSS/DSCN MKR DOCD: CPT | Performed by: FAMILY MEDICINE

## 2020-01-06 PROCEDURE — 1090F PRES/ABSN URINE INCON ASSESS: CPT | Performed by: FAMILY MEDICINE

## 2020-01-06 PROCEDURE — 4040F PNEUMOC VAC/ADMIN/RCVD: CPT | Performed by: FAMILY MEDICINE

## 2020-01-06 PROCEDURE — 99213 OFFICE O/P EST LOW 20 MIN: CPT | Performed by: FAMILY MEDICINE

## 2020-01-06 PROCEDURE — G8417 CALC BMI ABV UP PARAM F/U: HCPCS | Performed by: FAMILY MEDICINE

## 2020-01-06 PROCEDURE — 99214 OFFICE O/P EST MOD 30 MIN: CPT | Performed by: FAMILY MEDICINE

## 2020-01-06 PROCEDURE — G8482 FLU IMMUNIZE ORDER/ADMIN: HCPCS | Performed by: FAMILY MEDICINE

## 2020-01-06 PROCEDURE — G8427 DOCREV CUR MEDS BY ELIG CLIN: HCPCS | Performed by: FAMILY MEDICINE

## 2020-01-06 PROCEDURE — 1036F TOBACCO NON-USER: CPT | Performed by: FAMILY MEDICINE

## 2020-01-06 PROCEDURE — G8399 PT W/DXA RESULTS DOCUMENT: HCPCS | Performed by: FAMILY MEDICINE

## 2020-01-06 RX ORDER — AMLODIPINE BESYLATE 5 MG/1
5 TABLET ORAL DAILY
Qty: 90 TABLET | Refills: 3 | Status: SHIPPED | OUTPATIENT
Start: 2020-01-06 | End: 2020-12-28

## 2020-01-06 RX ORDER — ROSUVASTATIN CALCIUM 10 MG/1
TABLET, COATED ORAL
Qty: 13 TABLET | Refills: 3 | Status: SHIPPED | OUTPATIENT
Start: 2020-01-06 | End: 2020-12-28

## 2020-01-06 RX ORDER — CLOTRIMAZOLE AND BETAMETHASONE DIPROPIONATE 10; .64 MG/G; MG/G
CREAM TOPICAL
Qty: 30 G | Refills: 1 | Status: SHIPPED | OUTPATIENT
Start: 2020-01-06 | End: 2021-01-11 | Stop reason: SDUPTHER

## 2020-01-06 RX ORDER — LOSARTAN POTASSIUM AND HYDROCHLOROTHIAZIDE 12.5; 5 MG/1; MG/1
1 TABLET ORAL DAILY
Qty: 90 TABLET | Refills: 3 | Status: SHIPPED | OUTPATIENT
Start: 2020-01-06 | End: 2020-12-28

## 2020-01-06 ASSESSMENT — PATIENT HEALTH QUESTIONNAIRE - PHQ9
SUM OF ALL RESPONSES TO PHQ9 QUESTIONS 1 & 2: 0
SUM OF ALL RESPONSES TO PHQ QUESTIONS 1-9: 0
2. FEELING DOWN, DEPRESSED OR HOPELESS: 0
1. LITTLE INTEREST OR PLEASURE IN DOING THINGS: 0
SUM OF ALL RESPONSES TO PHQ QUESTIONS 1-9: 0

## 2020-01-06 NOTE — PROGRESS NOTES
TALIA Quevedo, twin scribing for and in the presence of Dr. Sundar Jose. 1/6/20/8:05am/SNP    [address] [address]  [place] [phone]  Dept: [phone]    Omkar Engel is a 70 y.o. female here for 6 Month Follow-Up; Hypertension; Hyperlipidemia; and Diabetes    HPI:  HYPERTENSION:  Medication compliance: compliant all of the time. Medication Therapy: amlodipine, losartan-HCTZ, Metoprolol  She is exercising, for 3 hours per week. She is adherent to a low-sodium diet.    Blood pressure is being monitored at home, average readings are 120-130/70s  Patient reports that [facility] limited alcohol intake. Does the patient have sleep apnea? She has not been tested  The patient's most recent LDL is below 190, which was checked on 12/23/2019 (123).     HYPERLIPIDEMIA:  Medication compliance: compliant all of the time. Medication Therapy: rosuvastatin (Crestor) and Aspirin, fish oil  Patient is  following a low fat, low cholesterol diet.    She is  exercising regularly, for 3 hours per week.     HYPERGLYCEMIA:  Diet compliance:  compliant all of the time  Current exercise: walks 3 hours/week    Prior to Admission medications    Medication Sig Start Date End Date Taking? Authorizing Provider   amLODIPine (NORVASC) 5 MG tablet Take 1 tablet by mouth daily 1/6/20  Yes Sundar Jose MD   losartan-hydrochlorothiazide [facility]) 50-12.5 MG per tablet Take 1 tablet by mouth daily 1/6/20  Yes Sundar Jose MD   metoprolol tartrate (LOPRESSOR) 25 MG tablet Take 1 tablet by mouth daily 1/6/20  Yes Sundar Jose MD   rosuvastatin (CRESTOR) 10 MG tablet TAKE 1 TABLET BY MOUTH ONCE A WEEK 1/6/20  Yes Sundar Jose MD   clotrimazole-betamethasone (LOTRISONE) 1-0.05 % cream Apply topically 2 times daily.  1/6/20  Yes Sundar Jose MD   neomycin-polymyxin-dexamethasone (MAXITROL) 0.1 % ophthalmic suspension Apply 1 drop to eye daily 1/6/20  Yes Sundar Jose MD   multivitamin tartrate (LOPRESSOR) 25 MG tablet     Sig: Take 1 tablet by mouth daily     Dispense:  90 tablet     Refill:  3    rosuvastatin (CRESTOR) 10 MG tablet     Sig: TAKE 1 TABLET BY MOUTH ONCE A WEEK     Dispense:  13 tablet     Refill:  3    clotrimazole-betamethasone (LOTRISONE) 1-0.05 % cream     Sig: Apply topically 2 times daily. Dispense:  30 g     Refill:  1    neomycin-polymyxin-dexamethasone (MAXITROL) 0.1 % ophthalmic suspension     Sig: Apply 1 drop to eye daily     Dispense:  1 Bottle     Refill:  3   I, Dr. Hakan Lake, personally performed the services described in this documentation as scribed by SHANON Mendoza in my presence, and is both accurate and complete.

## 2020-07-01 LAB
ABSOLUTE BASO #: 0 X10E9/L (ref 0–0.9)
ABSOLUTE EOS #: 0.1 X10E9/L (ref 0–0.4)
ABSOLUTE LYMPH #: 1.9 X10E9/L (ref 1–3.5)
ABSOLUTE MONO #: 0.4 X10E9/L (ref 0–0.9)
ABSOLUTE NEUT #: 1.7 X10E9/L (ref 1.5–6.6)
ALBUMIN SERPL-MCNC: 4.3 G/DL (ref 3.2–5.3)
ALK PHOSPHATASE: 77 U/L (ref 39–130)
ALT SERPL-CCNC: 24 U/L (ref 0–31)
ANION GAP SERPL CALCULATED.3IONS-SCNC: 10 MMOL/L (ref 5–15)
AST SERPL-CCNC: 21 U/L (ref 0–41)
AVERAGE GLUCOSE: 123 MG/DL
BASOPHILS RELATIVE PERCENT: 0.6 %
BILIRUB SERPL-MCNC: 0.7 MG/DL (ref 0.3–1.2)
BUN BLDV-MCNC: 18 MG/DL (ref 5–27)
CALCIUM SERPL-MCNC: 9.9 MG/DL (ref 8.5–10.5)
CHLORIDE BLD-SCNC: 103 MMOL/L (ref 98–109)
CO2: 28 MMOL/L (ref 22–32)
CREAT SERPL-MCNC: 0.91 MG/DL (ref 0.4–1)
EGFR AFRICAN AMERICAN: >60 ML/MIN/1.73SQ.M
EGFR IF NONAFRICAN AMERICAN: 60 ML/MIN/1.73SQ.M
EOSINOPHILS RELATIVE PERCENT: 1.3 %
GLUCOSE: 126 MG/DL (ref 65–99)
HBA1C MFR BLD: 5.9 % (ref 4.4–6.4)
HCT VFR BLD CALC: 42.1 % (ref 34–48)
HEMOGLOBIN: 13.9 G/DL (ref 11.7–16.1)
LYMPHOCYTE %: 46.6 %
MCH RBC QN AUTO: 31.5 PG (ref 27–35)
MCHC RBC AUTO-ENTMCNC: 33.1 G/DL (ref 31–36)
MCV RBC AUTO: 95 FL (ref 81–101)
MONOCYTES # BLD: 9.3 %
NEUTROPHILS RELATIVE PERCENT: 42.2 %
PDW BLD-RTO: 14.1 % (ref 11.5–14.7)
PLATELETS: 185 X10E9/L (ref 150–450)
PMV BLD AUTO: 7.2 FL (ref 7–12)
POTASSIUM SERPL-SCNC: 3.8 MMOL/L (ref 3.5–5)
RBC: 4.42 X10E12/L (ref 3.3–5.5)
SODIUM BLD-SCNC: 141 MMOL/L (ref 134–146)
TOTAL PROTEIN: 7.2 G/DL (ref 6–8)
WBC: 4.1 X10E9/L (ref 4–11.8)

## 2020-07-13 ENCOUNTER — OFFICE VISIT (OUTPATIENT)
Dept: FAMILY MEDICINE CLINIC | Age: 80
End: 2020-07-13
Payer: MEDICARE

## 2020-07-13 VITALS
HEIGHT: 65 IN | BODY MASS INDEX: 28.79 KG/M2 | DIASTOLIC BLOOD PRESSURE: 80 MMHG | WEIGHT: 172.8 LBS | SYSTOLIC BLOOD PRESSURE: 152 MMHG

## 2020-07-13 PROBLEM — Z86.010 HISTORY OF COLON POLYPS: Status: ACTIVE | Noted: 2020-07-13

## 2020-07-13 PROBLEM — Z86.0100 HISTORY OF COLON POLYPS: Status: ACTIVE | Noted: 2020-07-13

## 2020-07-13 PROBLEM — R73.9 HYPERGLYCEMIA: Status: ACTIVE | Noted: 2020-07-13

## 2020-07-13 PROCEDURE — G8427 DOCREV CUR MEDS BY ELIG CLIN: HCPCS | Performed by: FAMILY MEDICINE

## 2020-07-13 PROCEDURE — 1036F TOBACCO NON-USER: CPT | Performed by: FAMILY MEDICINE

## 2020-07-13 PROCEDURE — 1123F ACP DISCUSS/DSCN MKR DOCD: CPT | Performed by: FAMILY MEDICINE

## 2020-07-13 PROCEDURE — 99211 OFF/OP EST MAY X REQ PHY/QHP: CPT | Performed by: FAMILY MEDICINE

## 2020-07-13 PROCEDURE — G8417 CALC BMI ABV UP PARAM F/U: HCPCS | Performed by: FAMILY MEDICINE

## 2020-07-13 PROCEDURE — G8399 PT W/DXA RESULTS DOCUMENT: HCPCS | Performed by: FAMILY MEDICINE

## 2020-07-13 PROCEDURE — 1090F PRES/ABSN URINE INCON ASSESS: CPT | Performed by: FAMILY MEDICINE

## 2020-07-13 PROCEDURE — 4040F PNEUMOC VAC/ADMIN/RCVD: CPT | Performed by: FAMILY MEDICINE

## 2020-07-13 PROCEDURE — 99214 OFFICE O/P EST MOD 30 MIN: CPT | Performed by: NURSE PRACTITIONER

## 2020-07-13 SDOH — ECONOMIC STABILITY: FOOD INSECURITY: WITHIN THE PAST 12 MONTHS, YOU WORRIED THAT YOUR FOOD WOULD RUN OUT BEFORE YOU GOT MONEY TO BUY MORE.: NEVER TRUE

## 2020-07-13 SDOH — ECONOMIC STABILITY: INCOME INSECURITY: HOW HARD IS IT FOR YOU TO PAY FOR THE VERY BASICS LIKE FOOD, HOUSING, MEDICAL CARE, AND HEATING?: NOT HARD AT ALL

## 2020-07-13 SDOH — ECONOMIC STABILITY: FOOD INSECURITY: WITHIN THE PAST 12 MONTHS, THE FOOD YOU BOUGHT JUST DIDN'T LAST AND YOU DIDN'T HAVE MONEY TO GET MORE.: NEVER TRUE

## 2020-07-13 ASSESSMENT — PATIENT HEALTH QUESTIONNAIRE - PHQ9
SUM OF ALL RESPONSES TO PHQ QUESTIONS 1-9: 0
1. LITTLE INTEREST OR PLEASURE IN DOING THINGS: 0
SUM OF ALL RESPONSES TO PHQ QUESTIONS 1-9: 0
2. FEELING DOWN, DEPRESSED OR HOPELESS: 0
SUM OF ALL RESPONSES TO PHQ9 QUESTIONS 1 & 2: 0

## 2020-07-13 NOTE — PATIENT INSTRUCTIONS
PLAN:  - Labs were reviewed with the patient. We discussed her elevated glucose level and will continue to monitor her A1C. - The patient has a history of colon polyps. Her last colonoscopy was 2013. Patient was educated on the risks of colorectal cancer, particularly with a history of polyps. The patient denied a colonoscopy at this time. - Patient was encouraged to continue her low-fat/low-cholesterol diet and her weekly exercise regimen.   - Patient has a history of chronic otitis externa. She states that she is using her maxitrol otic drops prn, about once a month. She has no concerns at this time. - Follow up appointment scheduled in 6 months. Labs ordered. SURVEY:    You may be receiving a survey from [employer] regarding your visit today. Please complete the survey to enable us to provide the highest quality of care to you and your family. If you cannot score us a very good on any question, please call the office to discuss how we could have made your experience a very good one. Thank you. PLAN:  - Labs were reviewed with the patient. We discussed her elevated glucose level and will continue to monitor her A1C. - The patient has a history of colon polyps. Her last colonoscopy was 2013. Patient was educated on the risks of colorectal cancer, particularly with a history of polyps. The patient denied a colonoscopy at this time. - Patient was encouraged to continue her low-fat/low-cholesterol diet and her weekly exercise regimen.   - Patient has a history of chronic otitis externa. She states that she is using her maxitrol otic drops prn, about once a month. She has no concerns at this time. - Follow up appointment scheduled in 6 months. Labs ordered.

## 2020-07-13 NOTE — PROGRESS NOTES
Catalino Valadez, APRN-CNP, am scribing for and in the presence of Dr. Juwan Falk. 07/13/2020/8:57am/HMV    [address] [address] [place] [address]  Dept: [address] is a 70 y.o. female here for 6 Month Follow-Up; Hypertension; Hyperlipidemia; and Hyperglycemia      HPI:  HYPERTENSION:  Medication compliance: compliant all of the time. Medication Therapy: amlodipine, losartan-HCTZ, Metoprolol  She is exercising, for 3 hours per week. She is adherent to a low-sodium diet.    Blood pressure is being monitored at home, average readings are 130/70s  Patient reports that [facility] limited alcohol intake. Does the patient have sleep apnea? She has not been tested  The patient's most recent LDL is below 190, which was checked on 12/23/2019 (123).     HYPERLIPIDEMIA:  Medication compliance: compliant all of the time. Medication Therapy: rosuvastatin (Crestor) and Aspirin, fish oil  Patient is following a low fat, low cholesterol diet.    She is exercising regularly, for 3 hours per week. Her exercise includes golfing 3-4 times per week and walking the course.      HYPERGLYCEMIA:  Diet compliance:  compliant all of the time  Current exercise: walks 3 hours/week    Prior to Admission medications    Medication Sig Start Date End Date Taking? Authorizing Provider   amLODIPine (NORVASC) 5 MG tablet Take 1 tablet by mouth daily 1/6/20  Yes Juwan Falk MD   losartan-hydrochlorothiazide [facility]) 50-12.5 MG per tablet Take 1 tablet by mouth daily 1/6/20  Yes Juwan Falk MD   rosuvastatin (CRESTOR) 10 MG tablet TAKE 1 TABLET BY MOUTH ONCE A WEEK 1/6/20  Yes Juwan Falk MD   multivitamin [facility]) per tablet Take 1 tablet by mouth daily.    Yes Historical Provider, MD   aspirin 81 MG tablet Take 81 mg by mouth as needed    Yes Historical Provider, MD   metoprolol tartrate (LOPRESSOR) 25 MG tablet Take 1 tablet by mouth daily 1/6/20   Juwan Falk  Osteoarthrosis     Spondylolisthesis     L4-5    Symptomatic menopausal or female climacteric states     Thoracic and lumbosacral neuritis 2012    right L5 region      Social History     Tobacco Use    Smoking status: Former Smoker     Packs/day: 0.50     Years: 10.00     Pack years: 5.00     Types: Cigarettes     Last attempt to quit: 1985     Years since quittin.5    Smokeless tobacco: Never Used   Substance Use Topics    Alcohol use: Yes     Alcohol/week: 0.0 standard drinks     Comment: occasional      Current Outpatient Medications   Medication Sig Dispense Refill    amLODIPine (NORVASC) 5 MG tablet Take 1 tablet by mouth daily 90 tablet 3    losartan-hydrochlorothiazide (HYZAAR) 50-12.5 MG per tablet Take 1 tablet by mouth daily 90 tablet 3    rosuvastatin (CRESTOR) 10 MG tablet TAKE 1 TABLET BY MOUTH ONCE A WEEK 13 tablet 3    multivitamin (THERAGRAN) per tablet Take 1 tablet by mouth daily.  aspirin 81 MG tablet Take 81 mg by mouth as needed       metoprolol tartrate (LOPRESSOR) 25 MG tablet Take 1 tablet by mouth daily 90 tablet 3    clotrimazole-betamethasone (LOTRISONE) 1-0.05 % cream Apply topically 2 times daily. (Patient not taking: Reported on 2020) 30 g 1    neomycin-polymyxin-dexamethasone (MAXITROL) 0.1 % ophthalmic suspension Apply 1 drop to eye daily (Patient not taking: Reported on 2020) 1 Bottle 3     No current facility-administered medications for this visit. No Known Allergies    PHYSICAL EXAM:    BP (!) 152/80   Ht 5' 5\" (1.651 m)   Wt 172 lb 12.8 oz (78.4 kg)   BMI 28.76 kg/m²   Wt Readings from Last 3 Encounters:   20 172 lb 12.8 oz (78.4 kg)   20 173 lb 3.2 oz (78.6 kg)   19 172 lb 12.8 oz (78.4 kg)     BP Readings from Last 3 Encounters:   20 (!) 152/80   20 124/72   19 130/78       General Appearance: in no acute distress, well developed, well nourished.   Eyes: pupils equal, round reactive to light and accommodation. EOM intact. Ears: normal canal and TM's. No erythema or edema of canals bilaterally. +cerumen in left canal, not occluded. Left TM difficult to visualize. Nose: nares patent, no lesions. Oral Cavity: mucosa moist. Cavities present bilaterally. Throat: clear. Neck/Thyroid: neck supple, full range of motion, no cervical lymphadenopathy, no thyromegaly or carotid bruits. Skin: warm and dry. No suspicious lesions. Heart: regular rate and rhythm. No murmurs. S1, S2 normal, no gallops. Lungs: clear to auscultation bilaterally. Abdomen: bowel sounds present, soft, nontender, nondistended, no masses or organomegaly. Musculoskeletal: normal, full range of motion in knees and hips, no swelling or tenderness. Extremities: no cyanosis or edema. Peripheral Pulses: 2+ peripheral pulses, radial and posterior tibialis. Neurologic: nonfocal, motor strength normal upper and lower extremities, sensory exam intact. Psych: normal affect, speech fluent. ASSESSMENT:   Diagnosis Orders   1. Hypertension, essential     2. Hyperglycemia  Basic Metabolic Panel    CBC    Hemoglobin A1C    Microalbumin, Ur   3. Osteopenia, unspecified location  Vitamin D 25 Hydroxy   4. Hyperlipidemia, unspecified hyperlipidemia type  ALT    AST    Basic Metabolic Panel    CBC    Lipid Panel   5. History of colon polyps         PLAN:  - Labs were reviewed with the patient. We discussed her elevated glucose level and will continue to monitor her A1C. - The patient has a history of colon polyps. She had a colonoscopy 12/2008 with Dr. Jef Morel that showed a 1.5 adenomatous polyp. Her last colonoscopy was 2/2013 with Dr. Shital Dejesus that showed evidence of hemorrhoids. Patient was educated on the risks of colorectal cancer, particularly with a history of adenomatous polyps. She was educated on the benefits of colonoscopy. The patient denied a colonoscopy at this time.    - Patient was encouraged to continue her low-fat/low-cholesterol diet and her weekly exercise regimen.   - Patient has a history of chronic otitis externa. She states that she is using her maxitrol otic drops prn, about once a month. She has no concerns at this time. - Follow up appointment scheduled in 6 months. Labs ordered. Orders Placed This Encounter   Procedures    ALT     Standing Status:   Future     Standing Expiration Date:   7/13/2021    AST     Standing Status:   Future     Standing Expiration Date:   7/13/2021    Basic Metabolic Panel     Standing Status:   Future     Standing Expiration Date:   7/13/2021    CBC     Standing Status:   Future     Standing Expiration Date:   7/13/2021    Hemoglobin A1C     Standing Status:   Future     Standing Expiration Date:   7/13/2021    Lipid Panel     Standing Status:   Future     Standing Expiration Date:   7/13/2021     Order Specific Question:   Is Patient Fasting?/# of Hours     Answer:   no fasting    Vitamin D 25 Hydroxy     Standing Status:   Future     Standing Expiration Date:   7/13/2021    Microalbumin, Ur     Standing Status:   Future     Standing Expiration Date:   7/13/2021     No orders of the defined types were placed in this encounter. I, Dr. Shahbaz Hernandez, personally performed the services described in this documentation as scribed by JENNIFER Kraus in my presence, and is both accurate and complete.

## 2020-12-28 RX ORDER — AMLODIPINE BESYLATE 5 MG/1
TABLET ORAL
Qty: 90 TABLET | Refills: 3 | Status: SHIPPED | OUTPATIENT
Start: 2020-12-28 | End: 2021-11-04

## 2020-12-28 RX ORDER — LOSARTAN POTASSIUM AND HYDROCHLOROTHIAZIDE 12.5; 5 MG/1; MG/1
TABLET ORAL
Qty: 90 TABLET | Refills: 3 | Status: SHIPPED | OUTPATIENT
Start: 2020-12-28 | End: 2021-01-11 | Stop reason: SINTOL

## 2020-12-28 RX ORDER — ROSUVASTATIN CALCIUM 10 MG/1
TABLET, COATED ORAL
Qty: 13 TABLET | Refills: 3 | Status: SHIPPED | OUTPATIENT
Start: 2020-12-28 | End: 2021-11-04

## 2021-01-02 ASSESSMENT — LIFESTYLE VARIABLES
AUDIT TOTAL SCORE: 0
HOW MANY STANDARD DRINKS CONTAINING ALCOHOL DO YOU HAVE ON A TYPICAL DAY: ONE OR TWO
HOW OFTEN DURING THE LAST YEAR HAVE YOU HAD A FEELING OF GUILT OR REMORSE AFTER DRINKING: NEVER
HOW OFTEN DO YOU HAVE A DRINK CONTAINING ALCOHOL: FOUR OR MORE TIMES A WEEK
HOW OFTEN DURING THE LAST YEAR HAVE YOU NEEDED AN ALCOHOLIC DRINK FIRST THING IN THE MORNING TO GET YOURSELF GOING AFTER A NIGHT OF HEAVY DRINKING: 0
HOW OFTEN DO YOU HAVE SIX OR MORE DRINKS ON ONE OCCASION: NEVER
HOW OFTEN DURING THE LAST YEAR HAVE YOU FAILED TO DO WHAT WAS NORMALLY EXPECTED FROM YOU BECAUSE OF DRINKING: 0
HAS A RELATIVE, FRIEND, DOCTOR, OR ANOTHER HEALTH PROFESSIONAL EXPRESSED CONCERN ABOUT YOUR DRINKING OR SUGGESTED YOU CUT DOWN: NO
HOW OFTEN DURING THE LAST YEAR HAVE YOU BEEN UNABLE TO REMEMBER WHAT HAPPENED THE NIGHT BEFORE BECAUSE YOU HAD BEEN DRINKING: NEVER
AUDIT-C TOTAL SCORE: 0
HAVE YOU OR SOMEONE ELSE BEEN INJURED AS A RESULT OF YOUR DRINKING: NO
HOW OFTEN DURING THE LAST YEAR HAVE YOU FAILED TO DO WHAT WAS NORMALLY EXPECTED FROM YOU BECAUSE OF DRINKING: NEVER
AUDIT TOTAL SCORE: 4
HAVE YOU OR SOMEONE ELSE BEEN INJURED AS A RESULT OF YOUR DRINKING: 0
HOW OFTEN DO YOU HAVE SIX OR MORE DRINKS ON ONE OCCASION: 0
HOW OFTEN DURING THE LAST YEAR HAVE YOU NEEDED AN ALCOHOLIC DRINK FIRST THING IN THE MORNING TO GET YOURSELF GOING AFTER A NIGHT OF HEAVY DRINKING: NEVER
HOW OFTEN DURING THE LAST YEAR HAVE YOU FOUND THAT YOU WERE NOT ABLE TO STOP DRINKING ONCE YOU HAD STARTED: NEVER

## 2021-01-02 ASSESSMENT — PATIENT HEALTH QUESTIONNAIRE - PHQ9
SUM OF ALL RESPONSES TO PHQ QUESTIONS 1-9: 0
1. LITTLE INTEREST OR PLEASURE IN DOING THINGS: 0
SUM OF ALL RESPONSES TO PHQ QUESTIONS 1-9: 0
SUM OF ALL RESPONSES TO PHQ QUESTIONS 1-9: 0
SUM OF ALL RESPONSES TO PHQ9 QUESTIONS 1 & 2: 0

## 2021-01-05 LAB
ABSOLUTE BASO #: 0 X10E9/L (ref 0–0.2)
ABSOLUTE EOS #: 0.1 X10E9/L (ref 0–0.4)
ABSOLUTE LYMPH #: 2.4 X10E9/L (ref 1–3.5)
ABSOLUTE MONO #: 0.4 X10E9/L (ref 0–0.9)
ABSOLUTE NEUT #: 1.7 X10E9/L (ref 1.5–6.6)
ALT SERPL-CCNC: 19 U/L (ref 0–31)
ANION GAP SERPL CALCULATED.3IONS-SCNC: 9 MMOL/L (ref 5–15)
AST SERPL-CCNC: 20 U/L (ref 0–41)
AVERAGE GLUCOSE: 114 MG/DL
BASOPHILS RELATIVE PERCENT: 0.7 %
BUN BLDV-MCNC: 15 MG/DL (ref 5–27)
CALCIUM SERPL-MCNC: 10 MG/DL (ref 8.5–10.5)
CHLORIDE BLD-SCNC: 104 MMOL/L (ref 98–109)
CHOLESTEROL/HDL RATIO: 3.4 (ref 1–5)
CHOLESTEROL: 196 MG/DL (ref 150–200)
CO2: 30 MMOL/L (ref 22–32)
CREAT SERPL-MCNC: 0.79 MG/DL (ref 0.4–1)
CREATINE, URINE: 181.91 MG/DL
EGFR AFRICAN AMERICAN: >60 ML/MIN/1.73SQ.M
EGFR IF NONAFRICAN AMERICAN: >60 ML/MIN/1.73SQ.M
EOSINOPHILS RELATIVE PERCENT: 1.6 %
GLUCOSE: 106 MG/DL (ref 65–99)
HBA1C MFR BLD: 5.6 % (ref 4.4–6.4)
HCT VFR BLD CALC: 42.4 % (ref 35–47)
HDLC SERPL-MCNC: 57 MG/DL
HEMOGLOBIN: 14.1 G/DL (ref 11.7–15.5)
LDL CHOLESTEROL CALCULATED: 105 MG/DL
LDL/HDL RATIO: 1.8
LYMPHOCYTE %: 52.7 %
MCH RBC QN AUTO: 31.5 PG (ref 27–34)
MCHC RBC AUTO-ENTMCNC: 33.2 G/DL (ref 32–36)
MCV RBC AUTO: 95 FL (ref 80–100)
MICROALBUMIN/CREAT 24H UR: 4.5 MG/DL (ref 0–1.9)
MICROALBUMIN/CREAT UR-RTO: 24.7 MG/G CREAT (ref 0–30)
MONOCYTES # BLD: 8.1 %
NEUTROPHILS RELATIVE PERCENT: 36.9 %
PDW BLD-RTO: 14.6 % (ref 11.5–15)
PLATELETS: 204 X10E9/L (ref 150–450)
PMV BLD AUTO: 7.2 FL (ref 7–12)
POTASSIUM SERPL-SCNC: 3.4 MMOL/L (ref 3.5–5)
RBC: 4.47 X10E12/L (ref 3.8–5.2)
SODIUM BLD-SCNC: 143 MMOL/L (ref 134–146)
TRIGL SERPL-MCNC: 171 MG/DL (ref 27–150)
VITAMIN D 25-HYDROXY: 42 NG/ML (ref 30–100)
VLDLC SERPL CALC-MCNC: 34 MG/DL (ref 0–30)
WBC: 4.6 X10E9/L (ref 4–11)

## 2021-01-11 ENCOUNTER — OFFICE VISIT (OUTPATIENT)
Dept: FAMILY MEDICINE CLINIC | Age: 81
End: 2021-01-11
Payer: MEDICARE

## 2021-01-11 VITALS
WEIGHT: 175 LBS | DIASTOLIC BLOOD PRESSURE: 82 MMHG | HEIGHT: 65 IN | SYSTOLIC BLOOD PRESSURE: 128 MMHG | BODY MASS INDEX: 29.16 KG/M2

## 2021-01-11 DIAGNOSIS — E87.6 HYPOKALEMIA: ICD-10-CM

## 2021-01-11 DIAGNOSIS — M85.80 OSTEOPENIA, UNSPECIFIED LOCATION: ICD-10-CM

## 2021-01-11 DIAGNOSIS — L57.0 ACTINIC KERATOSIS OF MULTIPLE SITES OF HEAD AND NECK: ICD-10-CM

## 2021-01-11 DIAGNOSIS — Z78.0 MENOPAUSE: ICD-10-CM

## 2021-01-11 DIAGNOSIS — I10 HYPERTENSION, ESSENTIAL: Primary | ICD-10-CM

## 2021-01-11 DIAGNOSIS — R73.9 HYPERGLYCEMIA: ICD-10-CM

## 2021-01-11 DIAGNOSIS — I10 ESSENTIAL HYPERTENSION: ICD-10-CM

## 2021-01-11 DIAGNOSIS — E78.5 HYPERLIPIDEMIA, UNSPECIFIED HYPERLIPIDEMIA TYPE: ICD-10-CM

## 2021-01-11 DIAGNOSIS — J30.9 ALLERGIC RHINITIS, UNSPECIFIED SEASONALITY, UNSPECIFIED TRIGGER: ICD-10-CM

## 2021-01-11 PROCEDURE — 99213 OFFICE O/P EST LOW 20 MIN: CPT | Performed by: FAMILY MEDICINE

## 2021-01-11 PROCEDURE — 4040F PNEUMOC VAC/ADMIN/RCVD: CPT | Performed by: FAMILY MEDICINE

## 2021-01-11 PROCEDURE — 99211 OFF/OP EST MAY X REQ PHY/QHP: CPT | Performed by: FAMILY MEDICINE

## 2021-01-11 PROCEDURE — G0439 PPPS, SUBSEQ VISIT: HCPCS | Performed by: FAMILY MEDICINE

## 2021-01-11 PROCEDURE — G8482 FLU IMMUNIZE ORDER/ADMIN: HCPCS | Performed by: FAMILY MEDICINE

## 2021-01-11 PROCEDURE — 1123F ACP DISCUSS/DSCN MKR DOCD: CPT | Performed by: FAMILY MEDICINE

## 2021-01-11 RX ORDER — IMIQUIMOD 12.5 MG/.25G
CREAM TOPICAL
Qty: 1 BOX | Refills: 1 | Status: SHIPPED | OUTPATIENT
Start: 2021-01-11 | End: 2021-01-18

## 2021-01-11 RX ORDER — CLOTRIMAZOLE AND BETAMETHASONE DIPROPIONATE 10; .64 MG/G; MG/G
CREAM TOPICAL
Qty: 30 G | Refills: 1 | Status: SHIPPED | OUTPATIENT
Start: 2021-01-11 | End: 2022-01-11 | Stop reason: SDUPTHER

## 2021-01-11 RX ORDER — LOSARTAN POTASSIUM 50 MG/1
50 TABLET ORAL DAILY
Qty: 90 TABLET | Refills: 3 | Status: SHIPPED | OUTPATIENT
Start: 2021-01-11 | End: 2021-11-04

## 2021-01-11 NOTE — PATIENT INSTRUCTIONS
She overall looks good today  Her potassium level is a bit low, I will remove the HCTZ from the Losartan. She should continue to monitor her BP. We discussed her Osteoporosis and treatment options for this  I recommend a topical treatment for her sun damaged skin instead of having then zapped at the dermatologists office. She will think about it and if she becomes agreeable, I will give her Aldara. I will see her back in 6 months with labs prior    SURVEY:    You may be receiving a survey from [facility] regarding your visit today. Please complete the survey to enable us to provide the highest quality of care to you and your family. If you cannot score us a very good on any question, please call the office to discuss how we could of made your experience a very good one. Thank you.

## 2021-01-11 NOTE — PROGRESS NOTES
[place] , [facility] am scribing for and in the presence of Dr. Tigist Velasquez MD. :25 am/L    Medicare Annual Wellness Visit  Name: Karma Turner Date: 2021   MRN: [id number] Sex: Female   Age: 68 y.o. Ethnicity: Non-/Non    : 1940 Race: Wilda Quinones is here for Medicare AWV, Hypertension, Hyperlipidemia, and Hyperglycemia    HYPERTENSION:  Medication compliance: compliant all of the time. Medication Therapy: amlodipine, losartan-HCTZ, Metoprolol  She is exercising, for 3 hours per week. She is adherent to a low-sodium diet.    Blood pressure is being monitored at home, average readings are 130/70s  Patient reports that [facility] limited alcohol intake. Does the patient have sleep apnea? She has not been tested     HYPERLIPIDEMIA:  Medication compliance: compliant all of the time. Medication Therapy: rosuvastatin (Crestor) and Aspirin, fish oil  Patient is following a low fat, low cholesterol diet.    She is exercising regularly, for 3 hours per week. HYPERGLYCEMIA:  Diet compliance:  compliant all of the time  Current exercise: walks 3 hours/week     Screenings for behavioral, psychosocial and functional/safety risks, and cognitive dysfunction are all negative except as indicated below. These results, as well as other patient data from the [address] form, are documented in Flowsheets linked to this Encounter. No Known Allergies      Prior to Visit Medications    Medication Sig Taking? Authorizing Provider   losartan (COZAAR) 50 MG tablet Take 1 tablet by mouth daily Yes Tigist Velasquez MD   imiquimod (ALDARA) 5 % cream Apply topically three times a week. Yes Tigist Velasquez MD   neomycin-polymyxin-dexamethasone (MAXITROL) 0.1 % ophthalmic suspension Apply 1 drop to eye daily Yes Tigist Velasquez MD   clotrimazole-betamethasone (LOTRISONE) 1-0.05 % cream Apply topically 2 times daily.  Yes Tigist Velasquez MD   metoprolol tartrate (LOPRESSOR) 25 MG tablet TAKE 1 TABLET EVERY DAY Yes Kendra Acevedo MD   amLODIPine (NORVASC) 5 MG tablet TAKE 1 TABLET EVERY DAY Yes Kendra Acevedo MD   rosuvastatin (CRESTOR) 10 MG tablet TAKE 1 TABLET EVERY WEEK Yes Kendra Acevedo MD   multivitamin [facility]) per tablet Take 1 tablet by mouth daily. Yes Historical Provider, MD   aspirin 81 MG tablet Take 81 mg by mouth as needed  Yes Historical Provider, MD         Past Medical History:   Diagnosis Date    Hyperlipidemia 1994    Hypertension 1999    Osteoarthrosis     Spondylolisthesis     L4-5    Symptomatic menopausal or female climacteric states     Thoracic and lumbosacral neuritis 2012    right L5 region       Past Surgical History:   Procedure Laterality Date    COLONOSCOPY  02/07/2013    screening dr. Shane Currie, hemorrhoids    COLONOSCOPY  12/2008    1.5 cm adenomatous polyp Dr. Kristel Hayes  2014    [facility] L upper lip Dr Emanuel Pierce         Family History   Problem Relation Age of Onset    High Blood Pressure Mother     Cancer Mother     High Blood Pressure Father     Cancer Father      ROS:  General Constitutional: Denies chills. Denies fever. Denies headache. Denies lightheadedness. Ophthalmologic: Denies blurred vision. ENT: Denies nasal congestion. Denies sore throat. Denies ear pain and pressure. Respiratory: Denies cough. Denies shortness of breath. Denies wheezing. Cardiovascular: Denies chest pain at rest. Denies irregular heartbeat. Denies palpitations. Gastrointestinal: Denies abdominal pain. Denies blood in the stool. Denies constipation. Denies diarrhea. Denies nausea. Denies vomiting. Genitourinary: Denies blood in the urine. Denies difficulty urinating. Denies frequent urination. Denies painful urination. Denies urinary incontinence  Musculoskeletal: Denies muscle aches. Denies painful joints. Denies swollen joints.   Peripheral Vascular: Denies pain/cramping deformity or tenderness  Neurologic: reflexes normal and symmetric, no cranial nerve deficit, gait, coordination and speech normal    Patient's complete Health Risk Assessment and screening values have been reviewed and are found in Flowsheets. The following problems were reviewed today and where indicated follow up appointments were made and/or referrals ordered. Positive Risk Factor Screenings with Interventions:            General Health and ACP:  General  In general, how would you say your health is?: Good  In the past 7 days, have you experienced any of the following?  New or Increased Pain, New or Increased Fatigue, Loneliness, Social Isolation, Stress or Anger?: None of These  Do you get the social and emotional support that you need?: Yes  Do you have a Living Will?: Yes  Advance Directives     Power of  Living Will ACP-Advance Directive ACP-Power of     Not on File Not on File Not on File Not on File            Safety:  Safety  Do you have working smoke detectors?: Yes  Have all throw rugs been removed or fastened?: (!) No  Do you have non-slip mats or surfaces in all bathtubs/showers?: Yes  Do all of your stairways have a railing or banister?: Yes  Are your doorways, halls and stairs free of clutter?: Yes  Do you always fasten your seatbelt when you are in a car?: Yes  Safety Interventions:  · Home safety tips provided     Personalized Preventive Plan   Current Health Maintenance Status  Immunization History   Administered Date(s) Administered    Influenza Vaccine, unspecified formulation 10/19/2015, 10/24/2016    Influenza Virus Vaccine 10/19/2018    Influenza, High Dose (Fluzone 65 yrs and older) 10/06/2020    Influenza, Triv, inactivated, subunit, adjuvanted, IM (Fluad 65 yrs and older) 10/11/2019    Pneumococcal Conjugate 13-valent ([id number]) 01/11/2016    Pneumococcal Polysaccharide ([id number]) 10/01/2007    Zoster Live (Zostavax) 06/01/2012    Zoster Recombinant (Shingrix) 08/14/2019, 01/28/2020        Health Maintenance   Topic Date Due    Hepatitis C screen  1940    DTaP/Tdap/Td vaccine (1 - Tdap) 09/08/1961    Annual Wellness Visit (AWV)  05/29/2019    Lipid screen  01/05/2022    Potassium monitoring  01/05/2022    Creatinine monitoring  01/05/2022    DEXA (modify frequency per FRAX score)  Completed    Flu vaccine  Completed    Shingles Vaccine  Completed    Pneumococcal 65+ years Vaccine  Completed    Hepatitis A vaccine  Aged Out    Hepatitis B vaccine  Aged Out    Hib vaccine  Aged Out    Meningococcal (ACWY) vaccine  Aged Out     Plan:  She overall looks good today  We discussed sreening exams and age of pt  I am ok with no longer screening for colorectal cancer as she desires  Her potassium level is a bit low, I will remove the HCTZ from the Losartan we discussed the etiology of K loss . She should continue to monitor her BP. We discussed her Osteoporosis and treatment options for this  I recommend a topical treatment for her sun damaged skin instead of having then zapped at the dermatologists office. I will give her Aldara. We discussed the pros and cons of treating this way  She is well versed in its usage    I will see her back in 6 months with labs prior    Recommendations for Preventive Services Due: see orders and patient instructions/AVS.  . Recommended screening schedule for the next 5-10 years is provided to the patient in written form: see Patient Ld Sagastume was seen today for medicare awv, hypertension, hyperlipidemia and hyperglycemia. Diagnoses and all orders for this visit:    Hypertension, essential  -     losartan (COZAAR) 50 MG tablet; Take 1 tablet by mouth daily    Hyperglycemia  -     Hemoglobin A1C; Future    Hyperlipidemia, unspecified hyperlipidemia type  -     CBC Auto Differential; Future  -     Basic Metabolic Panel; Future  -     AST; Future  -     ALT;  Future    Osteopenia, unspecified location  -     DEXA BONE DENSITY 2 SITES; Future    Menopause  -     DEXA BONE DENSITY 2 SITES; Future    Allergic rhinitis, unspecified seasonality, unspecified trigger    Essential hypertension    Hypokalemia    Actinic keratosis of multiple sites of head and neck    Other orders  -     imiquimod (ALDARA) 5 % cream; Apply topically three times a week. -     neomycin-polymyxin-dexamethasone (MAXITROL) 0.1 % ophthalmic suspension; Apply 1 drop to eye daily  -     clotrimazole-betamethasone (LOTRISONE) 1-0.05 % cream; Apply topically 2 times daily. I, Dr. Jose Kim, personally performed the services described in this documentation as scribed by DOUG Ahuja in my presence, and is both accurate and complete.

## 2021-01-13 ENCOUNTER — HOSPITAL ENCOUNTER (OUTPATIENT)
Dept: WOMENS IMAGING | Age: 81
Discharge: HOME | End: 2021-01-15
Payer: MEDICARE

## 2021-01-13 DIAGNOSIS — Z78.0 MENOPAUSE: ICD-10-CM

## 2021-01-13 DIAGNOSIS — M85.80 OSTEOPENIA, UNSPECIFIED LOCATION: ICD-10-CM

## 2021-01-13 PROCEDURE — 77080 DXA BONE DENSITY AXIAL: CPT

## 2021-01-13 NOTE — RESULT ENCOUNTER NOTE
Call the DEXA shows borderline osteoporosis in the hip  This is still treated with vit D and calcium  We can consider reclast if she has a fracture or if the dexa scan worsens in the future

## 2021-07-07 LAB
ABSOLUTE BASO #: 0 X10E9/L (ref 0–0.2)
ABSOLUTE EOS #: 0.1 X10E9/L (ref 0–0.4)
ABSOLUTE LYMPH #: 2 X10E9/L (ref 1–3.5)
ABSOLUTE MONO #: 0.3 X10E9/L (ref 0–0.9)
ABSOLUTE NEUT #: 1.8 X10E9/L (ref 1.5–6.6)
ALT SERPL-CCNC: 18 U/L (ref 0–31)
ANION GAP SERPL CALCULATED.3IONS-SCNC: 9 MMOL/L (ref 5–15)
AST SERPL-CCNC: 23 U/L (ref 0–41)
AVERAGE GLUCOSE: 117 MG/DL
BASOPHILS RELATIVE PERCENT: 0.5 %
BUN BLDV-MCNC: 15 MG/DL (ref 5–27)
CALCIUM SERPL-MCNC: 9.6 MG/DL (ref 8.5–10.5)
CHLORIDE BLD-SCNC: 106 MMOL/L (ref 98–109)
CO2: 28 MMOL/L (ref 22–32)
CREAT SERPL-MCNC: 0.86 MG/DL (ref 0.4–1)
EGFR AFRICAN AMERICAN: >60 ML/MIN/1.73SQ.M
EGFR IF NONAFRICAN AMERICAN: >60 ML/MIN/1.73SQ.M
EOSINOPHILS RELATIVE PERCENT: 1.6 %
GLUCOSE: 124 MG/DL (ref 65–99)
HBA1C MFR BLD: 5.7 % (ref 4.4–6.4)
HCT VFR BLD CALC: 39.1 % (ref 35–47)
HEMOGLOBIN: 13.4 G/DL (ref 11.7–15.5)
LYMPHOCYTE %: 47.9 %
MCH RBC QN AUTO: 31.5 PG (ref 27–34)
MCHC RBC AUTO-ENTMCNC: 34.3 G/DL (ref 32–36)
MCV RBC AUTO: 92 FL (ref 80–100)
MONOCYTES # BLD: 7.6 %
NEUTROPHILS RELATIVE PERCENT: 42.4 %
PDW BLD-RTO: 14.1 % (ref 11.5–15)
PLATELETS: 170 X10E9/L (ref 150–450)
PMV BLD AUTO: 7.3 FL (ref 7–12)
POTASSIUM SERPL-SCNC: 3.7 MMOL/L (ref 3.5–5)
RBC: 4.26 X10E12/L (ref 3.8–5.2)
SODIUM BLD-SCNC: 143 MMOL/L (ref 134–146)
WBC: 4.2 X10E9/L (ref 4–11)

## 2021-07-12 ENCOUNTER — OFFICE VISIT (OUTPATIENT)
Dept: FAMILY MEDICINE CLINIC | Age: 81
End: 2021-07-12
Payer: MEDICARE

## 2021-07-12 VITALS
DIASTOLIC BLOOD PRESSURE: 78 MMHG | HEIGHT: 65 IN | WEIGHT: 170 LBS | BODY MASS INDEX: 28.32 KG/M2 | SYSTOLIC BLOOD PRESSURE: 132 MMHG | OXYGEN SATURATION: 96 %

## 2021-07-12 DIAGNOSIS — E78.5 HYPERLIPIDEMIA, UNSPECIFIED HYPERLIPIDEMIA TYPE: ICD-10-CM

## 2021-07-12 DIAGNOSIS — R73.9 HYPERGLYCEMIA: ICD-10-CM

## 2021-07-12 DIAGNOSIS — H60.8X9: ICD-10-CM

## 2021-07-12 DIAGNOSIS — I10 ESSENTIAL HYPERTENSION: Primary | ICD-10-CM

## 2021-07-12 DIAGNOSIS — Z86.010 HISTORY OF COLON POLYPS: ICD-10-CM

## 2021-07-12 PROCEDURE — 1123F ACP DISCUSS/DSCN MKR DOCD: CPT | Performed by: FAMILY MEDICINE

## 2021-07-12 PROCEDURE — 1036F TOBACCO NON-USER: CPT | Performed by: FAMILY MEDICINE

## 2021-07-12 PROCEDURE — 4040F PNEUMOC VAC/ADMIN/RCVD: CPT | Performed by: FAMILY MEDICINE

## 2021-07-12 PROCEDURE — 99211 OFF/OP EST MAY X REQ PHY/QHP: CPT | Performed by: FAMILY MEDICINE

## 2021-07-12 PROCEDURE — 1090F PRES/ABSN URINE INCON ASSESS: CPT | Performed by: FAMILY MEDICINE

## 2021-07-12 PROCEDURE — G8417 CALC BMI ABV UP PARAM F/U: HCPCS | Performed by: FAMILY MEDICINE

## 2021-07-12 PROCEDURE — G8399 PT W/DXA RESULTS DOCUMENT: HCPCS | Performed by: FAMILY MEDICINE

## 2021-07-12 PROCEDURE — 99214 OFFICE O/P EST MOD 30 MIN: CPT | Performed by: FAMILY MEDICINE

## 2021-07-12 PROCEDURE — G8427 DOCREV CUR MEDS BY ELIG CLIN: HCPCS | Performed by: FAMILY MEDICINE

## 2021-07-12 NOTE — PROGRESS NOTES
I, Yuri Rossi [facility], am scribing for and in the presence of Dr. Belkys Porras. 7/12/21/9:10/[facility]    [address] [address]  [place] [phone]  Dept: [phone]    Oumar Thorne is a 68 y.o. female here for 6 Month Follow-Up, Hypertension, Hyperlipidemia, and Hyperglycemia  pt has no concerns     HPI:  HYPERTENSION:  Medication compliance: compliant all of the time. Medication Therapy: amlodipine, losartan-HCTZ, Metoprolol  She is exercising, for 3 hours per week. She is adherent to a low-sodium diet.    Blood pressure is being monitored at home, average readings are 130/70s  Patient reports that [facility] limited alcohol intake. Does the patient have sleep apnea? She has not been tested     HYPERLIPIDEMIA:  Medication compliance: compliant all of the time. Medication Therapy: rosuvastatin (Crestor) and Aspirin  Patient is following a low fat, low cholesterol diet.    She is exercising regularly, for 3 hours per week.      HYPERGLYCEMIA:  Diet compliance:  compliant all of the time  Current exercise: walks 3 hours/week  Prior to Admission medications    Medication Sig Start Date End Date Taking? Authorizing Provider   neomycin-polymyxin-dexamethasone (MAXITROL) 0.1 % ophthalmic suspension Apply 1 drop to eye daily  Patient taking differently: Apply 1 drop to eye as needed PRN drops in ear 1/11/21  Yes Belkys Porras MD   clotrimazole-betamethasone (LOTRISONE) 1-0.05 % cream Apply topically 2 times daily. 1/11/21  Yes Belkys Porras MD   metoprolol tartrate (LOPRESSOR) 25 MG tablet TAKE 1 TABLET EVERY DAY 12/28/20  Yes Belkys Porras MD   amLODIPine (NORVASC) 5 MG tablet TAKE 1 TABLET EVERY DAY 12/28/20  Yes Belkys Porras MD   rosuvastatin (CRESTOR) 10 MG tablet TAKE 1 TABLET EVERY WEEK 12/28/20  Yes Belkys Porras MD   multivitamin [facility]) per tablet Take 1 tablet by mouth daily.    Yes Historical Provider, MD   aspirin 81 MG tablet Take 81 mg by mouth as needed Yes Historical Provider, MD   losartan (COZAAR) 50 MG tablet Take 1 tablet by mouth daily 1/11/21 4/11/21  José Antonio Barnett MD       ROS:  General Constitutional: Denies chills. Denies fever. Denies headache. Denies lightheadedness. Ophthalmologic: Denies blurred vision. ENT: Denies nasal congestion. Denies sore throat. Denies ear pain and pressure. Respiratory: Denies cough. Denies shortness of breath. Denies wheezing. Cardiovascular: Denies chest pain at rest. Denies irregular heartbeat. Denies palpitations. Gastrointestinal: Denies abdominal pain. Denies blood in the stool. Denies constipation. Denies diarrhea. Denies nausea. Denies vomiting. Genitourinary: Denies blood in the urine. Denies difficulty urinating. Denies frequent urination. Denies painful urination. Denies urinary incontinence. Musculoskeletal: Denies muscle aches. Denies painful joints. Denies swollen joints. Peripheral Vascular: Denies pain/cramping in legs after exertion. Skin: Denies dry skin. Denies itching. Denies rash. Neurologic: Denies falls. Denies dizziness. Denies fainting. Denies tingling/numbness. Psychiatric: Denies sleep disturbance. Denies anxiety. Denies depressed mood.     Past Surgical History:   Procedure Laterality Date    COLONOSCOPY  02/07/2013    screening dr. Haseeb Juárez, hemorrhoids    COLONOSCOPY  12/2008    1.5 cm adenomatous polyp Dr. Dudley Regulus SURGERY  2014    BCC L upper lip Dr Diandra Eugene       Family History   Problem Relation Age of Onset    High Blood Pressure Mother     Cancer Mother     High Blood Pressure Father     Cancer Father        Past Medical History:   Diagnosis Date    Hyperlipidemia 1994    Hypertension 1999    Osteoarthrosis     Spondylolisthesis     L4-5    Symptomatic menopausal or female climacteric states     Thoracic and lumbosacral neuritis 2012    right L5 region      Social History     Tobacco Use    Smoking status: Former Smoker     Packs/day: 0.50     Years: 10.00     Pack years: 5.00     Types: Cigarettes     Quit date: 1985     Years since quittin.5    Smokeless tobacco: Never Used   Substance Use Topics    Alcohol use: Yes     Alcohol/week: 0.0 standard drinks     Comment: occasional      Current Outpatient Medications   Medication Sig Dispense Refill    neomycin-polymyxin-dexamethasone (MAXITROL) 0.1 % ophthalmic suspension Apply 1 drop to eye daily (Patient taking differently: Apply 1 drop to eye as needed PRN drops in ear) 1 Bottle 3    clotrimazole-betamethasone (LOTRISONE) 1-0.05 % cream Apply topically 2 times daily. 30 g 1    metoprolol tartrate (LOPRESSOR) 25 MG tablet TAKE 1 TABLET EVERY DAY 90 tablet 3    amLODIPine (NORVASC) 5 MG tablet TAKE 1 TABLET EVERY DAY 90 tablet 3    rosuvastatin (CRESTOR) 10 MG tablet TAKE 1 TABLET EVERY WEEK 13 tablet 3    multivitamin (THERAGRAN) per tablet Take 1 tablet by mouth daily.  aspirin 81 MG tablet Take 81 mg by mouth as needed       losartan (COZAAR) 50 MG tablet Take 1 tablet by mouth daily 90 tablet 3     No current facility-administered medications for this visit. No Known Allergies    PHYSICAL EXAM:    /78   Ht 5' 5\" (1.651 m)   Wt 170 lb (77.1 kg)   SpO2 96%   BMI 28.29 kg/m²   Wt Readings from Last 3 Encounters:   21 170 lb (77.1 kg)   21 175 lb (79.4 kg)   20 172 lb 12.8 oz (78.4 kg)     BP Readings from Last 3 Encounters:   21 132/78   21 128/82   20 (!) 152/80       General Appearance: in no acute distress, well developed, well nourished. Wearing glasses   Eyes: pupils equal, round reactive to light and accommodation. Ears: normal canal and TM's. Nose: nares patent, no lesions. Oral Cavity: mucosa moist.  Throat: clear. Neck/Thyroid: neck supple, full range of motion, no cervical lymphadenopathy, no thyromegaly or carotid bruits. Skin: warm and dry.  No suspicious lesions. Heart: regular rate and rhythm. No murmurs. S1, S2 normal, no gallops. Lungs: clear to auscultation bilaterally. Abdomen: bowel sounds present, soft, nontender, nondistended, no masses or organomegaly. Musculoskeletal: normal, full range of motion in knees and hips, no swelling or tenderness. Extremities: no cyanosis or edema. Peripheral Pulses: 2+ throughout, symetric. Neurologic: nonfocal, motor strength normal upper and lower extremities, sensory exam intact. Psych: normal affect, speech fluent. ASSESSMENT:   Diagnosis Orders   1. Essential hypertension     2. Hyperglycemia     3. Hyperlipidemia, unspecified hyperlipidemia type     4. History of colon polyps     5. Other noninfectious chronic otitis externa, unspecified laterality         PLAN:  I review that she already received her Covid-19 vaccine and a Shingrix with no side effects. She has no care gaps to discuss. I review her labs which all look great. A1C is 5.7 which is unchanged for several years. We discuss Covid - 23 and the disease itself and how it has effected her and her family personally along with the vaccination and talk of additional vaccines coming out as the make up is constantly changing. I will see her back in 6 months for her [facility] wellness with labs prior. No orders of the defined types were placed in this encounter. No orders of the defined types were placed in this encounter. I, Dr. Alessandra Hernandez, personally performed the services described in this documentation as scribed by APURVA Cobian in my presence, and is both accurate and complete.

## 2021-07-12 NOTE — PATIENT INSTRUCTIONS
SURVEY:    You may be receiving a survey from [employer] regarding your visit today. Please complete the survey to enable us to provide the highest quality of care to you and your family. If you cannot score us a very good on any question, please call the office to discuss how we could of made your experience a very good one. Thank you. PLAN:  I review that she already received her Covid-19 vaccine and a Shingrix with no side effects. She has no care gaps to discuss. I review her labs which all look great. A1C is 5.7 which is unchanged for several years. We discuss Covid - 23 and the disease itself and how it has effected her and her family personally along with the vaccination and talk of additional vaccines coming out as the make up is constantly changing. I will see her back in 6 months for her [facility] wellness with labs prior.

## 2021-11-04 DIAGNOSIS — I10 HYPERTENSION, ESSENTIAL: ICD-10-CM

## 2021-11-04 RX ORDER — AMLODIPINE BESYLATE 5 MG/1
TABLET ORAL
Qty: 90 TABLET | Refills: 3 | Status: SHIPPED | OUTPATIENT
Start: 2021-11-04

## 2021-11-04 RX ORDER — LOSARTAN POTASSIUM 50 MG/1
TABLET ORAL
Qty: 90 TABLET | Refills: 3 | Status: SHIPPED | OUTPATIENT
Start: 2021-11-04 | End: 2022-09-23

## 2021-11-04 RX ORDER — ROSUVASTATIN CALCIUM 10 MG/1
TABLET, COATED ORAL
Qty: 13 TABLET | Refills: 3 | Status: SHIPPED | OUTPATIENT
Start: 2021-11-04 | End: 2022-09-23

## 2021-12-29 ASSESSMENT — LIFESTYLE VARIABLES
HOW OFTEN DURING THE LAST YEAR HAVE YOU BEEN UNABLE TO REMEMBER WHAT HAPPENED THE NIGHT BEFORE BECAUSE YOU HAD BEEN DRINKING: NEVER
HAVE YOU OR SOMEONE ELSE BEEN INJURED AS A RESULT OF YOUR DRINKING: 0
HOW OFTEN DO YOU HAVE SIX OR MORE DRINKS ON ONE OCCASION: 0
HOW MANY STANDARD DRINKS CONTAINING ALCOHOL DO YOU HAVE ON A TYPICAL DAY: ONE OR TWO
AUDIT TOTAL SCORE: 0
HOW OFTEN DO YOU HAVE SIX OR MORE DRINKS ON ONE OCCASION: NEVER
HOW OFTEN DURING THE LAST YEAR HAVE YOU NEEDED AN ALCOHOLIC DRINK FIRST THING IN THE MORNING TO GET YOURSELF GOING AFTER A NIGHT OF HEAVY DRINKING: NEVER
HOW OFTEN DURING THE LAST YEAR HAVE YOU FAILED TO DO WHAT WAS NORMALLY EXPECTED FROM YOU BECAUSE OF DRINKING: NEVER
HOW OFTEN DURING THE LAST YEAR HAVE YOU NEEDED AN ALCOHOLIC DRINK FIRST THING IN THE MORNING TO GET YOURSELF GOING AFTER A NIGHT OF HEAVY DRINKING: 0
AUDIT-C TOTAL SCORE: 0
HOW OFTEN DURING THE LAST YEAR HAVE YOU FOUND THAT YOU WERE NOT ABLE TO STOP DRINKING ONCE YOU HAD STARTED: 0
HOW OFTEN DO YOU HAVE A DRINK CONTAINING ALCOHOL: 4
HOW OFTEN DURING THE LAST YEAR HAVE YOU FOUND THAT YOU WERE NOT ABLE TO STOP DRINKING ONCE YOU HAD STARTED: NEVER
AUDIT-C TOTAL SCORE: 4
HOW OFTEN DO YOU HAVE A DRINK CONTAINING ALCOHOL: FOUR OR MORE TIMES A WEEK
HOW OFTEN DURING THE LAST YEAR HAVE YOU HAD A FEELING OF GUILT OR REMORSE AFTER DRINKING: NEVER
HOW OFTEN DURING THE LAST YEAR HAVE YOU FAILED TO DO WHAT WAS NORMALLY EXPECTED FROM YOU BECAUSE OF DRINKING: 0
HAVE YOU OR SOMEONE ELSE BEEN INJURED AS A RESULT OF YOUR DRINKING: NO
HAS A RELATIVE, FRIEND, DOCTOR, OR ANOTHER HEALTH PROFESSIONAL EXPRESSED CONCERN ABOUT YOUR DRINKING OR SUGGESTED YOU CUT DOWN: 0
HOW MANY STANDARD DRINKS CONTAINING ALCOHOL DO YOU HAVE ON A TYPICAL DAY: 0
AUDIT TOTAL SCORE: 4
HOW OFTEN DURING THE LAST YEAR HAVE YOU BEEN UNABLE TO REMEMBER WHAT HAPPENED THE NIGHT BEFORE BECAUSE YOU HAD BEEN DRINKING: 0
HOW OFTEN DURING THE LAST YEAR HAVE YOU HAD A FEELING OF GUILT OR REMORSE AFTER DRINKING: 0
HAS A RELATIVE, FRIEND, DOCTOR, OR ANOTHER HEALTH PROFESSIONAL EXPRESSED CONCERN ABOUT YOUR DRINKING OR SUGGESTED YOU CUT DOWN: NO

## 2021-12-29 ASSESSMENT — PATIENT HEALTH QUESTIONNAIRE - PHQ9
2. FEELING DOWN, DEPRESSED OR HOPELESS: 0
SUM OF ALL RESPONSES TO PHQ QUESTIONS 1-9: 0
SUM OF ALL RESPONSES TO PHQ9 QUESTIONS 1 & 2: 0
SUM OF ALL RESPONSES TO PHQ QUESTIONS 1-9: 0
SUM OF ALL RESPONSES TO PHQ QUESTIONS 1-9: 0
1. LITTLE INTEREST OR PLEASURE IN DOING THINGS: 0

## 2022-01-05 LAB
ABSOLUTE BASO #: 0 X10E9/L (ref 0–0.2)
ABSOLUTE EOS #: 0.1 X10E9/L (ref 0–0.4)
ABSOLUTE LYMPH #: 1.9 X10E9/L (ref 1–3.5)
ABSOLUTE MONO #: 0.5 X10E9/L (ref 0–0.9)
ABSOLUTE NEUT #: 2.1 X10E9/L (ref 1.5–6.6)
ALT SERPL-CCNC: 15 U/L (ref 0–31)
ANION GAP SERPL CALCULATED.3IONS-SCNC: 10 MMOL/L (ref 5–15)
AST SERPL-CCNC: 19 U/L (ref 0–41)
AVERAGE GLUCOSE: 123 MG/DL
BASOPHILS RELATIVE PERCENT: 0.7 %
BUN BLDV-MCNC: 16 MG/DL (ref 5–27)
CALCIUM SERPL-MCNC: 9.7 MG/DL (ref 8.5–10.5)
CHLORIDE BLD-SCNC: 107 MMOL/L (ref 98–109)
CO2: 27 MMOL/L (ref 22–32)
CREAT SERPL-MCNC: 0.91 MG/DL (ref 0.4–1)
EGFR AFRICAN AMERICAN: >60 ML/MIN/1.73SQ.M
EGFR IF NONAFRICAN AMERICAN: 60 ML/MIN/1.73SQ.M
EOSINOPHILS RELATIVE PERCENT: 1.4 %
GLUCOSE: 115 MG/DL (ref 65–99)
HBA1C MFR BLD: 5.9 % (ref 4.4–6.4)
HCT VFR BLD CALC: 41.9 % (ref 35–47)
HEMOGLOBIN: 14.2 G/DL (ref 11.7–15.5)
LYMPHOCYTE %: 41.7 %
MCH RBC QN AUTO: 31.2 PG (ref 27–34)
MCHC RBC AUTO-ENTMCNC: 33.8 G/DL (ref 32–36)
MCV RBC AUTO: 92 FL (ref 80–100)
MONOCYTES # BLD: 10.4 %
NEUTROPHILS RELATIVE PERCENT: 45.8 %
PDW BLD-RTO: 14.2 % (ref 11.5–15)
PLATELETS: 190 X10E9/L (ref 150–450)
PMV BLD AUTO: 7.3 FL (ref 7–12)
POTASSIUM SERPL-SCNC: 4 MMOL/L (ref 3.5–5)
RBC: 4.55 X10E12/L (ref 3.8–5.2)
SODIUM BLD-SCNC: 144 MMOL/L (ref 134–146)
WBC: 4.6 X10E9/L (ref 4–11)

## 2022-01-11 ENCOUNTER — OFFICE VISIT (OUTPATIENT)
Dept: FAMILY MEDICINE CLINIC | Age: 82
End: 2022-01-11
Payer: MEDICARE

## 2022-01-11 VITALS
WEIGHT: 177 LBS | DIASTOLIC BLOOD PRESSURE: 88 MMHG | BODY MASS INDEX: 29.49 KG/M2 | SYSTOLIC BLOOD PRESSURE: 152 MMHG | HEIGHT: 65 IN | OXYGEN SATURATION: 97 %

## 2022-01-11 DIAGNOSIS — I10 ESSENTIAL HYPERTENSION: ICD-10-CM

## 2022-01-11 DIAGNOSIS — E78.5 HYPERLIPIDEMIA, UNSPECIFIED HYPERLIPIDEMIA TYPE: ICD-10-CM

## 2022-01-11 DIAGNOSIS — R73.9 HYPERGLYCEMIA: ICD-10-CM

## 2022-01-11 DIAGNOSIS — H60.8X9: ICD-10-CM

## 2022-01-11 DIAGNOSIS — D72.818 OTHER DECREASED WHITE BLOOD CELL (WBC) COUNT: ICD-10-CM

## 2022-01-11 DIAGNOSIS — Z00.00 ENCOUNTER FOR ANNUAL WELLNESS EXAM IN MEDICARE PATIENT: Primary | ICD-10-CM

## 2022-01-11 DIAGNOSIS — M85.80 OSTEOPENIA, UNSPECIFIED LOCATION: ICD-10-CM

## 2022-01-11 PROCEDURE — G8399 PT W/DXA RESULTS DOCUMENT: HCPCS | Performed by: FAMILY MEDICINE

## 2022-01-11 PROCEDURE — G8417 CALC BMI ABV UP PARAM F/U: HCPCS | Performed by: FAMILY MEDICINE

## 2022-01-11 PROCEDURE — G0439 PPPS, SUBSEQ VISIT: HCPCS | Performed by: FAMILY MEDICINE

## 2022-01-11 PROCEDURE — G8484 FLU IMMUNIZE NO ADMIN: HCPCS | Performed by: FAMILY MEDICINE

## 2022-01-11 PROCEDURE — 4040F PNEUMOC VAC/ADMIN/RCVD: CPT | Performed by: FAMILY MEDICINE

## 2022-01-11 PROCEDURE — G8427 DOCREV CUR MEDS BY ELIG CLIN: HCPCS | Performed by: FAMILY MEDICINE

## 2022-01-11 PROCEDURE — 1036F TOBACCO NON-USER: CPT | Performed by: FAMILY MEDICINE

## 2022-01-11 PROCEDURE — 99211 OFF/OP EST MAY X REQ PHY/QHP: CPT | Performed by: FAMILY MEDICINE

## 2022-01-11 PROCEDURE — 1123F ACP DISCUSS/DSCN MKR DOCD: CPT | Performed by: FAMILY MEDICINE

## 2022-01-11 PROCEDURE — 99213 OFFICE O/P EST LOW 20 MIN: CPT | Performed by: FAMILY MEDICINE

## 2022-01-11 PROCEDURE — 1090F PRES/ABSN URINE INCON ASSESS: CPT | Performed by: FAMILY MEDICINE

## 2022-01-11 RX ORDER — CLOTRIMAZOLE AND BETAMETHASONE DIPROPIONATE 10; .64 MG/G; MG/G
CREAM TOPICAL
Qty: 30 G | Refills: 1 | Status: SHIPPED | OUTPATIENT
Start: 2022-01-11

## 2022-01-11 NOTE — PATIENT INSTRUCTIONS
SURVEY:    You may be receiving a survey from [employer] regarding your visit today. Please complete the survey to enable us to provide the highest quality of care to you and your family. If you cannot score us a very good on any question, please call the office to discuss how we could of made your experience a very good one. Thank you. PLAN:  We discuss how healthy she is and still very physically active. She use to enjoy the [facility] but due to Covid does not go there as often. She mentions she is currently keeping busy redoing a bedroom climbing a step ladder taking border off and getting ready to paint it. She does mentions she still enjoys golfing, minimal discomfort in arch of R foot she assumes is from turning in her swing. I review her labs, and am overall pleased. Her A1C is in good range at 5.9. I review her DEXA I recommend she continue taking Vitamin D. I will get a repeat Dexa next year 2023 and consider further treatment such as Reclast.     She is maintaining her otitis externa very well using Maxitrol PRN when she feels her ears are getting dry and itching. I will see her back in 6 months with labs prior.

## 2022-01-11 NOTE — PROGRESS NOTES
Thang Dang CMA am scribing for and in the presence of Dr. Bk Judd MD. :35/CRF    Medicare Annual Wellness Visit  Name: Sanjay Blanca Date: 2022   MRN: [id number] Sex: Female   Age: 80 y.o. Ethnicity: Non- / Non    : 1940 Race: White (non-)      Sonali Andrew is here for Medicare AWV, Hypertension, Hyperlipidemia, and Hyperglycemia    HPI  HYPERTENSION:  Medication compliance: compliant all of the time. Medication Therapy: amlodipine, losartan-HCTZ, Metoprolol  She is exercising, for 3 hours per week. She is adherent to a low-sodium diet.    Blood pressure is being monitored at home, average readings are 130/70s  Patient reports that [facility] limited alcohol intake. Does the patient have sleep apnea? She has not been tested     HYPERLIPIDEMIA:  Medication compliance: compliant all of the time. Medication Therapy: rosuvastatin (Crestor) and Aspirin, fish oil  Patient is following a low fat, low cholesterol diet.    She is exercising regularly, for 3 hours per week.      HYPERGLYCEMIA:  Diet compliance:  compliant all of the time  Current exercise: walks 3 hours/week  Screenings for behavioral, psychosocial and functional/safety risks, and cognitive dysfunction are all negative except as indicated below. These results, as well as other patient data from the [address] form, are documented in Flowsheets linked to this Encounter. The history listed above was reviewed today and is accurate. ROS:  General Constitutional: Denies chills. Denies fever. Denies headache. Denies lightheadedness. Ophthalmologic: Denies blurred vision. ENT: Denies nasal congestion. Denies sore throat. Denies ear pain and pressure. Respiratory: Denies cough. Denies shortness of breath. Denies wheezing. Cardiovascular: Denies chest pain at rest. Denies irregular heartbeat. Denies palpitations. Gastrointestinal: Denies abdominal pain. Denies blood in the stool. Denies constipation. Denies diarrhea. Denies nausea. Denies vomiting. Genitourinary: Denies blood in the urine. Denies difficulty urinating. Denies frequent urination. Denies painful urination. Denies urinary incontinence  Musculoskeletal: Denies muscle aches. Denies painful joints. Denies swollen joints. Peripheral Vascular: Denies pain/cramping in legs after exertion. Skin: Denies dry skin. Denies itching. Denies rash. Neurologic: Denies falls. Denies dizziness. Denies fainting. Denies tingling/numbness. Psychiatric: Denies sleep disturbance. Denies anxiety. Denies depressed mood. No Known Allergies      Prior to Visit Medications    Medication Sig Taking? Authorizing Provider   clotrimazole-betamethasone (LOTRISONE) 1-0.05 % cream Apply topically 2 times daily. Yes Fahad Barksdale MD   neomycin-polymyxin-dexamethasone (MAXITROL) 0.1 % ophthalmic suspension Apply 1 drop to eye as needed (Apply 1 drop to eye as needed PRN drops in ear) PRN drops in ear Yes Fahad Barksdale MD   losartan (COZAAR) 50 MG tablet TAKE 1 TABLET EVERY DAY Yes Fahad Barksdale MD   rosuvastatin (CRESTOR) 10 MG tablet TAKE 1 TABLET EVERY WEEK Yes Fahad Barksdale MD   metoprolol tartrate (LOPRESSOR) 25 MG tablet TAKE 1 TABLET EVERY DAY Yes Fahad Barksdale MD   amLODIPine (NORVASC) 5 MG tablet TAKE 1 TABLET EVERY DAY Yes Fahad Barksdale MD   multivitamin [facility]) per tablet Take 1 tablet by mouth daily.  Yes Historical Provider, MD   aspirin 81 MG tablet Take 81 mg by mouth as needed  Yes Historical Provider, MD         Past Medical History:   Diagnosis Date    Hyperlipidemia 1994    Hypertension 1999    Osteoarthrosis     Spondylolisthesis     L4-5    Symptomatic menopausal or female climacteric states     Thoracic and lumbosacral neuritis 2012    right L5 region       Past Surgical History:   Procedure Laterality Date    COLONOSCOPY  02/07/2013    screening dr. Joe Nolan, hemorrhoids    COLONOSCOPY  12/2008    1.5 cm adenomatous polyp Dr. [place]  2014    BCC L upper lip Dr Davonte Jett         Family History   Problem Relation Age of Onset    High Blood Pressure Mother     Cancer Mother     High Blood Pressure Father     Cancer Father        CareTeam (Including outside providers/suppliers regularly involved in providing care):   Patient Care Team:  Robbi Day MD as PCP - General  Robbi Day MD as PCP [facility]    Wt Readings from Last 3 Encounters:   01/11/22 177 lb (80.3 kg)   07/12/21 170 lb (77.1 kg)   01/11/21 175 lb (79.4 kg)     Vitals:    01/11/22 0924   BP: (!) 152/88   SpO2: 97%   Weight: 177 lb (80.3 kg)   Height: 5' 5\" (1.651 m)     Body mass index is 29.45 kg/m². Based upon direct observation of the patient, evaluation of cognition reveals recent and remote memory intact.     General Appearance: alert and oriented to person, place and time, well developed and well- nourished, in no acute distress wearing glasses  Skin: warm and dry, no rash or erythema  Head: normocephalic and atraumatic  Eyes: pupils equal, round, and reactive to light, extraocular eye movements intact, conjunctivae normal  ENT: tympanic membrane, external ear and ear canal normal bilaterally, nose without deformity, nasal mucosa and turbinates normal without polyps  Neck: supple and non-tender without mass, no thyromegaly or thyroid nodules, no cervical lymphadenopathy  Pulmonary/Chest: clear to auscultation bilaterally- no wheezes, rales or rhonchi, normal air movement, no respiratory distress  Cardiovascular: normal rate, regular rhythm, normal S1 and S2, no murmurs, rubs, clicks, or gallops, distal pulses intact, no carotid bruits reg 80  Abdomen: soft, non-tender, non-distended, normal bowel sounds, no masses or organomegaly  Extremities: no cyanosis, clubbing or edema trace edema R  Musculoskeletal: normal range of motion, no joint swelling, deformity or tenderness scoliosis upper dorsal minimal kypohsis , multiple SK  Neurologic: reflexes normal and symmetric, no cranial nerve deficit, gait, coordination and speech normal    Patient's complete Health Risk Assessment and screening values have been reviewed and are found in Flowsheets. The following problems were reviewed today and where indicated follow up appointments were made and/or referrals ordered. Positive Risk Factor Screenings with Interventions:              General Health and ACP:  General  In general, how would you say your health is?: Good  In the past 7 days, have you experienced any of the following?  New or Increased Pain, New or Increased Fatigue, Loneliness, Social Isolation, Stress or Anger?: None of These  Do you get the social and emotional support that you need?: Yes  Do you have a Living Will?: Yes  Advance Directives     Power of  Living Will ACP-Advance Directive ACP-Power of     Not on File Not on File Not on File Not on File      General Health Risk Interventions:  · see plan for intervention       Safety:  Safety  Do you have working smoke detectors?: Yes  Have all throw rugs been removed or fastened?: (!) No  Do you have non-slip mats or surfaces in all bathtubs/showers?: (!) No  Do all of your stairways have a railing or banister?: Yes  Are your doorways, halls and stairs free of clutter?: Yes  Do you always fasten your seatbelt when you are in a car?: Yes  Safety Interventions:  · Home safety tips provided       Personalized Preventive Plan   Current Health Maintenance Status  Immunization History   Administered Date(s) Administered    HAILEYIDKrystle Griffin, Primary or Immunocompromised, PF, 100mcg/0.5mL 03/10/2021, 04/07/2021, 11/22/2021    Influenza Vaccine, unspecified formulation 10/19/2015, 10/24/2016    Influenza Virus Vaccine 10/19/2018    Influenza, High Dose (Fluzone 65 yrs and older) 10/06/2020    Influenza, Quadv, adjuvanted, 65 yrs +, IM, PF (Fluad) 10/08/2021    Influenza, Triv, inactivated, subunit, adjuvanted, IM (Fluad 65 yrs and older) 10/11/2019    Pneumococcal Conjugate 13-valent ([id number]) 01/11/2016    Pneumococcal Polysaccharide ([id number]) 10/01/2007    Zoster Live (Zostavax) 06/01/2012    Zoster Recombinant (Shingrix) 08/14/2019, 01/28/2020        Health Maintenance   Topic Date Due    Lipid screen  01/05/2022    Annual Wellness Visit (AWV)  01/12/2022    DTaP/Tdap/Td vaccine (1 - Tdap) 07/12/2022 (Originally 9/8/1959)    Hepatitis C screen  07/12/2022 (Originally 1940)    Depression Screen  12/29/2022    Potassium monitoring  01/05/2023    Creatinine monitoring  01/05/2023    DEXA (modify frequency per FRAX score)  Completed    Flu vaccine  Completed    Shingles Vaccine  Completed    Pneumococcal 65+ years Vaccine  Completed    COVID-19 Vaccine  Completed    Hepatitis A vaccine  Aged Out    Hepatitis B vaccine  Aged Out    Hib vaccine  Aged Out    Meningococcal (ACWY) vaccine  Aged Out     PLAN:  We discuss how healthy she is and still very physically active. She use to enjoy the [facility] but due to Covid does not go there as often. She mentions she is currently keeping busy redoing a bedroom climbing a step ladder taking border off and getting ready to paint it. She does mentions she still enjoys golfing, minimal discomfort in arch of R foot she assumes is from turning in her swing. I review her labs, and am overall pleased. Her A1C is in good range at 5.9. I review her DEXA I recommend she continue taking Vitamin D. I will get a repeat Dexa next year 2023 and consider further treatment such as Reclast.     She is maintaining her otitis externa very well using Maxitrol PRN when she feels her ears are getting dry and itching. I will see her back in 6 months with labs prior. Recommendations for Preventive Services Due: see orders and patient instructions/AVS.  .   Recommended screening schedule for the next 5-10 years is provided to the patient in written form: see Patient Sarah Posadas was seen today for medicare awv, hypertension, hyperlipidemia and hyperglycemia. Diagnoses and all orders for this visit:    Essential hypertension  -     CBC Auto Differential; Future  -     ALT; Future  -     AST; Future  -     Basic Metabolic Panel; Future  -     Lipid Panel; Future  -     Hemoglobin A1C; Future  -     Vitamin D 25 Hydroxy; Future  -     CRP,High Sensitivity; Future    Hyperglycemia  -     CBC Auto Differential; Future  -     ALT; Future  -     AST; Future  -     Basic Metabolic Panel; Future  -     Lipid Panel; Future  -     Hemoglobin A1C; Future  -     Vitamin D 25 Hydroxy; Future  -     CRP,High Sensitivity; Future    Hyperlipidemia, unspecified hyperlipidemia type  -     CBC Auto Differential; Future  -     ALT; Future  -     AST; Future  -     Basic Metabolic Panel; Future  -     Lipid Panel; Future  -     Hemoglobin A1C; Future  -     Vitamin D 25 Hydroxy; Future  -     CRP,High Sensitivity; Future    Osteopenia, unspecified location  -     Vitamin D 25 Hydroxy; Future    Other decreased white blood cell (WBC) count    Other noninfectious chronic otitis externa, unspecified laterality    Other orders  -     clotrimazole-betamethasone (LOTRISONE) 1-0.05 % cream; Apply topically 2 times daily. -     neomycin-polymyxin-dexamethasone (MAXITROL) 0.1 % ophthalmic suspension; Apply 1 drop to eye as needed (Apply 1 drop to eye as needed PRN drops in ear) PRN drops in ear             I, Dr. Bianca Sharp, personally performed the services described in this documentation as scribed/transcribed by APURVA Yeung in my presence, and is both accurate and complete.

## 2022-07-15 ENCOUNTER — OFFICE VISIT (OUTPATIENT)
Dept: PRIMARY CARE CLINIC | Age: 82
End: 2022-07-15
Payer: MEDICARE

## 2022-07-15 VITALS
WEIGHT: 171 LBS | DIASTOLIC BLOOD PRESSURE: 105 MMHG | OXYGEN SATURATION: 98 % | HEART RATE: 75 BPM | TEMPERATURE: 98.4 F | BODY MASS INDEX: 28.49 KG/M2 | HEIGHT: 65 IN | SYSTOLIC BLOOD PRESSURE: 160 MMHG

## 2022-07-15 DIAGNOSIS — I10 ESSENTIAL HYPERTENSION: Primary | ICD-10-CM

## 2022-07-15 DIAGNOSIS — E55.9 VITAMIN D DEFICIENCY: ICD-10-CM

## 2022-07-15 DIAGNOSIS — R73.9 HYPERGLYCEMIA: ICD-10-CM

## 2022-07-15 DIAGNOSIS — E78.5 HYPERLIPIDEMIA, UNSPECIFIED HYPERLIPIDEMIA TYPE: ICD-10-CM

## 2022-07-15 PROCEDURE — 99214 OFFICE O/P EST MOD 30 MIN: CPT | Performed by: NURSE PRACTITIONER

## 2022-07-15 PROCEDURE — G8427 DOCREV CUR MEDS BY ELIG CLIN: HCPCS | Performed by: NURSE PRACTITIONER

## 2022-07-15 PROCEDURE — 1036F TOBACCO NON-USER: CPT | Performed by: NURSE PRACTITIONER

## 2022-07-15 PROCEDURE — 1090F PRES/ABSN URINE INCON ASSESS: CPT | Performed by: NURSE PRACTITIONER

## 2022-07-15 PROCEDURE — 99211 OFF/OP EST MAY X REQ PHY/QHP: CPT

## 2022-07-15 PROCEDURE — 1123F ACP DISCUSS/DSCN MKR DOCD: CPT | Performed by: NURSE PRACTITIONER

## 2022-07-15 PROCEDURE — G8417 CALC BMI ABV UP PARAM F/U: HCPCS | Performed by: NURSE PRACTITIONER

## 2022-07-15 PROCEDURE — G8399 PT W/DXA RESULTS DOCUMENT: HCPCS | Performed by: NURSE PRACTITIONER

## 2022-07-15 SDOH — ECONOMIC STABILITY: FOOD INSECURITY: WITHIN THE PAST 12 MONTHS, THE FOOD YOU BOUGHT JUST DIDN'T LAST AND YOU DIDN'T HAVE MONEY TO GET MORE.: NEVER TRUE

## 2022-07-15 SDOH — ECONOMIC STABILITY: FOOD INSECURITY: WITHIN THE PAST 12 MONTHS, YOU WORRIED THAT YOUR FOOD WOULD RUN OUT BEFORE YOU GOT MONEY TO BUY MORE.: NEVER TRUE

## 2022-07-15 ASSESSMENT — SOCIAL DETERMINANTS OF HEALTH (SDOH): HOW HARD IS IT FOR YOU TO PAY FOR THE VERY BASICS LIKE FOOD, HOUSING, MEDICAL CARE, AND HEATING?: NOT HARD AT ALL

## 2022-07-15 ASSESSMENT — PATIENT HEALTH QUESTIONNAIRE - PHQ9
1. LITTLE INTEREST OR PLEASURE IN DOING THINGS: 0
SUM OF ALL RESPONSES TO PHQ QUESTIONS 1-9: 0
2. FEELING DOWN, DEPRESSED OR HOPELESS: 0
SUM OF ALL RESPONSES TO PHQ QUESTIONS 1-9: 0
SUM OF ALL RESPONSES TO PHQ9 QUESTIONS 1 & 2: 0
SUM OF ALL RESPONSES TO PHQ QUESTIONS 1-9: 0
SUM OF ALL RESPONSES TO PHQ QUESTIONS 1-9: 0

## 2022-07-15 ASSESSMENT — ENCOUNTER SYMPTOMS: SHORTNESS OF BREATH: 0

## 2022-07-15 NOTE — PATIENT INSTRUCTIONS
SURVEY:    You may be receiving a survey from [employer] regarding your visit today. Please complete the survey to enable us to provide the highest quality of care to you and your family. If you cannot score us a very good on any question, please call the office to discuss how we could have made your experience a very good one. Thank you.

## 2022-07-15 NOTE — PROGRESS NOTES
Name: Britany De Dios  : 1940         Chief Complaint:     Chief Complaint   Patient presents with    6 Month Follow-Up    Hypertension     Does occasionally monitor bp at home 130/70-80. Does watch dietary sodium. Hyperglycemia     Follows low carb diet    Hyperlipidemia     Golfing 3-4x weekly, does outdoor gardening and stays busy. Follows low fat diet. History of Present Illness:      Britany De Dios is a 80 y.o.  female who presents with 6 Month Follow-Up, Hypertension (Does occasionally monitor bp at home 130/70-80. Does watch dietary sodium. ), Hyperglycemia (Follows low carb diet), and Hyperlipidemia (Golfing 3-4x weekly, does outdoor gardening and stays busy. Follows low fat diet. )      HPI    Hypertension:  Current medication regimen includes losartan 50 mg, metoprolol tartrate 25 mg, amlodipine 5 mg daily. She admits monitoring her blood pressure at home. At-home blood pressure is averaging 130/70-80. She admits a well balanced diet. She admits following a low-sodium diet. For physical activity, she notes golfing 3-4x weekly and gardening. She denies chest pain, shortness of breath, headache, vision changes, palpitations, lightheadedness, or dizziness. Hyperlipidemia:  Current treatment includes rosuvastatin 10mg QD. She denies low cholesterol, low fat diet. 22 . Osteopenia:  Current treatment includes multivitamin. She attempted to supplement with calcium and Vit D which caused significant constipation and upset stomach.       Past Medical History:     Past Medical History:   Diagnosis Date    Hyperlipidemia     Hypertension     Osteoarthrosis     Spondylolisthesis     L4-5    Symptomatic menopausal or female climacteric states     Thoracic and lumbosacral neuritis 2012    right L5 region      Reviewed all health maintenance requirements and ordered appropriate tests  Health Maintenance Due   Topic Date Due    Hepatitis C screen  Never done    DTaP/Tdap/Td vaccine (1 - Tdap) Never done    COVID-19 Vaccine (4 - Booster for Moderna series) 03/22/2022       Past Surgical History:     Past Surgical History:   Procedure Laterality Date    COLONOSCOPY  02/07/2013    screening dr. Jaciel Kee, hemorrhoids    COLONOSCOPY  12/2008    1.5 cm adenomatous polyp Dr. Anh Up  2014    BCC L upper lip Dr Johnny Benoit        Medications:       Prior to Admission medications    Medication Sig Start Date End Date Taking? Authorizing Provider   clotrimazole-betamethasone (LOTRISONE) 1-0.05 % cream Apply topically 2 times daily. 1/11/22   Anjelica Rosenberg MD   neomycin-polymyxin-dexamethasone (MAXITROL) 0.1 % ophthalmic suspension Apply 1 drop to eye as needed (Apply 1 drop to eye as needed PRN drops in ear) PRN drops in ear 1/11/22   Anjelica Rosenberg MD   losartan (COZAAR) 50 MG tablet TAKE 1 TABLET EVERY DAY 11/4/21   Anjelica Rosenberg MD   rosuvastatin (CRESTOR) 10 MG tablet TAKE 1 TABLET EVERY WEEK 11/4/21   Anjelica Rosenberg MD   metoprolol tartrate (LOPRESSOR) 25 MG tablet TAKE 1 TABLET EVERY DAY 11/4/21   Anjelica Rosenberg MD   amLODIPine (NORVASC) 5 MG tablet TAKE 1 TABLET EVERY DAY 11/4/21   Anjelica Rosenberg MD   multivitamin [facility]) per tablet Take 1 tablet by mouth daily. Historical Provider, MD   aspirin 81 MG tablet Take 81 mg by mouth as needed     Historical Provider, MD        Allergies:       Patient has no known allergies. Social History:     Tobacco:    reports that she quit smoking about 37 years ago. Her smoking use included cigarettes. She has a 5.00 pack-year smoking history. She has never used smokeless tobacco.  Alcohol:      reports current alcohol use. Drug Use:  has no history on file for drug use.     Family History:     Family History   Problem Relation Age of Onset    High Blood Pressure Mother     Cancer Mother     High Blood Pressure Father     Cancer Father        Review of Systems: Positive and Negative as described in HPI    Review of Systems   Eyes:  Negative for visual disturbance. Respiratory:  Negative for shortness of breath. Cardiovascular:  Negative for chest pain and palpitations. Neurological:  Negative for dizziness, light-headedness and headaches. Physical Exam:   Vitals:  BP (!) 160/105   Pulse 75   Temp 98.4 °F (36.9 °C)   Ht 5' 5\" (1.651 m)   Wt 171 lb (77.6 kg)   SpO2 98%   BMI 28.46 kg/m²     Physical Exam  Constitutional:       General: She is not in acute distress. Appearance: Normal appearance. She is normal weight. She is not ill-appearing or toxic-appearing. HENT:      Right Ear: Tympanic membrane, ear canal and external ear normal. There is no impacted cerumen. Left Ear: Tympanic membrane, ear canal and external ear normal. There is no impacted cerumen. Nose: Nose normal. No congestion or rhinorrhea. Mouth/Throat:      Mouth: Mucous membranes are moist.      Pharynx: No oropharyngeal exudate or posterior oropharyngeal erythema. Cardiovascular:      Rate and Rhythm: Normal rate and regular rhythm. Heart sounds: Normal heart sounds. No murmur heard. Pulmonary:      Effort: Pulmonary effort is normal. No respiratory distress. Breath sounds: Normal breath sounds. No wheezing or rales. Lymphadenopathy:      Cervical: No cervical adenopathy. Neurological:      Mental Status: She is alert. Psychiatric:         Mood and Affect: Mood normal.         Behavior: Behavior normal.         Thought Content:  Thought content normal.         Judgment: Judgment normal.       Data:     Lab Results   Component Value Date/Time     07/06/2022 09:38 AM    K 4.0 07/06/2022 09:38 AM     07/06/2022 09:38 AM    CO2 24 07/06/2022 09:38 AM    BUN 12 07/06/2022 09:38 AM    CREATININE 0.77 07/06/2022 09:38 AM    GLUCOSE 135 07/06/2022 09:38 AM    PROT 7.2 07/01/2020 08:45 AM    LABALBU 4.3 07/01/2020 08:45 AM    BILITOT 0.7 07/01/2020 08:45 AM    ALKPHOS 77 07/01/2020 08:45 AM    ALKPHOS 71 01/05/2016 09:15 AM    AST 22 07/06/2022 09:38 AM    ALT 17 07/06/2022 09:38 AM     Lab Results   Component Value Date/Time    WBC 4.8 07/06/2022 09:38 AM    WBC 3.3 07/06/2016 08:48 AM    RBC 4.83 07/06/2022 09:38 AM    HGB 14.6 07/06/2022 09:38 AM    HCT 44.1 07/06/2022 09:38 AM    MCV 91.3 07/06/2022 09:38 AM    MCH 30.2 07/06/2022 09:38 AM    MCHC 33.1 07/06/2022 09:38 AM    RDW 13.3 07/06/2022 09:38 AM     07/06/2022 09:38 AM     07/06/2016 08:48 AM    MPV 9.5 07/06/2022 09:38 AM     Lab Results   Component Value Date/Time    TSH 3.27 12/23/2019 09:25 AM     Lab Results   Component Value Date/Time    CHOL 192 07/06/2022 09:38 AM    HDL 51 07/06/2022 09:38 AM    LABA1C 6.0 07/06/2022 09:38 AM       Assessment/Plan:      Diagnosis Orders   1. Essential hypertension  ALT    AST    Basic Metabolic Panel    CBC      2. Hyperlipidemia, unspecified hyperlipidemia type  Lipid Panel      3. Hyperglycemia  Hemoglobin A1C      4. Vitamin D deficiency  Vitamin D 25 Hydroxy        Hypertension:   - Elevated today in office on 2 separate accounts. She does note history of whitecoat hypertension. She reports her at-home blood pressure is 130s/70s-80s. - Continue losartan 50 mg QD, metoprolol tartrate 25 mg QD, amlodipine 5 mg QD  - Reviewed lifestyle modifications to assist with lowering blood pressure including weight loss, healthy diet, exercising routinely, low-sodium diet, limiting caffeine and alcohol, and encouraging stress relief techniques.   - Monitor blood pressure at home once daily and call office with readings in 2 weeks    Hyperlipidemia:  - Reviewed lab work 7/2022, total cholesterol 192, triglycerides 160,   - Counseled on low-cholesterol diet and routine physical activity  - Continue rosuvastatin 10 mg daily    Osteopenia:  - Patient unable to tolerate increased doses of calcium and vitamin D due to significant side effects  - Continue daily multivitamin  - 7/2022 vitamin D level WNL  - Continue routine exercise    Prediabetes:  - Reviewed A1c 7/2022 6.0%  - Counseled on diabetic diet and routine physical activity  - Continue routine exercise    Completed Refills   Requested Prescriptions      No prescriptions requested or ordered in this encounter       Orders Placed This Encounter   Procedures    ALT     Standing Status:   Future     Standing Expiration Date:   7/15/2023    AST     Standing Status:   Future     Standing Expiration Date:   2/86/8488    Basic Metabolic Panel     Standing Status:   Future     Standing Expiration Date:   7/15/2023    CBC     Standing Status:   Future     Standing Expiration Date:   7/15/2023    Hemoglobin A1C     Standing Status:   Future     Standing Expiration Date:   7/15/2023    Lipid Panel     Standing Status:   Future     Standing Expiration Date:   7/15/2023    Vitamin D 25 Hydroxy     Standing Status:   Future     Standing Expiration Date:   7/15/2023          No results found for this visit on 07/15/22. Return in about 6 months (around 1/15/2023), or if symptoms worsen or fail to improve, for AWV labs prior.     Electronically signed by JAMAR Zavala CNP on 07/15/22 at 12:47 PM.

## 2022-07-29 ENCOUNTER — TELEPHONE (OUTPATIENT)
Dept: PRIMARY CARE CLINIC | Age: 82
End: 2022-07-29

## 2022-07-29 NOTE — TELEPHONE ENCOUNTER
----- Message from JAMAR Branham CNP sent at 7/29/2022  9:40 AM EDT -----  Regarding: FW: BP  Please call patient and request daily BP readings   ----- Message -----  From: JAMAR Branham CNP  Sent: 7/29/2022   9:40 AM EDT  To: JAMAR Branham CNP  Subject: FW: BP                                           Please call patient and request daily BP readings  ----- Message -----  From: JAMAR Branham CNP  Sent: 7/29/2022  12:00 AM EDT  To: JAMAR Branham CNP  Subject: BP                                               Request BP readings

## 2022-07-29 NOTE — TELEPHONE ENCOUNTER
----- Message from JAMAR Valencia CNP sent at 7/29/2022  9:40 AM EDT -----  Regarding: FW: BP  Please call patient and request daily BP readings   ----- Message -----  From: JAMAR Valencia CNP  Sent: 7/29/2022   9:40 AM EDT  To: JAMAR Valencia CNP  Subject: FW: BP                                           Please call patient and request daily BP readings  ----- Message -----  From: JAMAR Valencia CNP  Sent: 7/29/2022  12:00 AM EDT  To: JAMAR Valencia CNP  Subject: BP                                               Request BP readings no

## 2022-07-29 NOTE — TELEPHONE ENCOUNTER
07/18/2022: 132/90  07/19/2022: 320/91  39/48/6003: 008/56  07/21/2022: 122/82  07/22/2022: 120/80  07/25/2022: 126/86

## 2022-08-18 ENCOUNTER — APPOINTMENT (OUTPATIENT)
Dept: GENERAL RADIOLOGY | Age: 82
End: 2022-08-18
Payer: MEDICARE

## 2022-08-18 ENCOUNTER — HOSPITAL ENCOUNTER (EMERGENCY)
Age: 82
Discharge: HOME | End: 2022-08-18
Attending: EMERGENCY MEDICINE
Payer: MEDICARE

## 2022-08-18 VITALS
OXYGEN SATURATION: 96 % | RESPIRATION RATE: 16 BRPM | DIASTOLIC BLOOD PRESSURE: 84 MMHG | TEMPERATURE: 97.7 F | HEART RATE: 74 BPM | SYSTOLIC BLOOD PRESSURE: 156 MMHG

## 2022-08-18 DIAGNOSIS — S52.502A CLOSED FRACTURE OF DISTAL END OF LEFT RADIUS, UNSPECIFIED FRACTURE MORPHOLOGY, INITIAL ENCOUNTER: Primary | ICD-10-CM

## 2022-08-18 PROCEDURE — 99283 EMERGENCY DEPT VISIT LOW MDM: CPT

## 2022-08-18 PROCEDURE — 29125 APPL SHORT ARM SPLINT STATIC: CPT

## 2022-08-18 PROCEDURE — 73110 X-RAY EXAM OF WRIST: CPT

## 2022-08-18 PROCEDURE — 6370000000 HC RX 637 (ALT 250 FOR IP): Performed by: EMERGENCY MEDICINE

## 2022-08-18 RX ORDER — ACETAMINOPHEN 325 MG/1
650 TABLET ORAL EVERY 6 HOURS PRN
COMMUNITY

## 2022-08-18 RX ORDER — ACETAMINOPHEN 500 MG
1000 TABLET ORAL ONCE
Status: COMPLETED | OUTPATIENT
Start: 2022-08-18 | End: 2022-08-18

## 2022-08-18 RX ORDER — HYDROCODONE BITARTRATE AND ACETAMINOPHEN 5; 325 MG/1; MG/1
1 TABLET ORAL EVERY 6 HOURS PRN
Qty: 12 TABLET | Refills: 0 | Status: SHIPPED | OUTPATIENT
Start: 2022-08-18 | End: 2022-08-21

## 2022-08-18 RX ADMIN — ACETAMINOPHEN 1000 MG: 500 TABLET, FILM COATED ORAL at 09:52

## 2022-08-18 ASSESSMENT — PAIN DESCRIPTION - ORIENTATION
ORIENTATION: LEFT

## 2022-08-18 ASSESSMENT — PAIN DESCRIPTION - LOCATION
LOCATION: WRIST

## 2022-08-18 ASSESSMENT — PAIN DESCRIPTION - FREQUENCY
FREQUENCY: CONTINUOUS
FREQUENCY: INTERMITTENT

## 2022-08-18 ASSESSMENT — PAIN - FUNCTIONAL ASSESSMENT: PAIN_FUNCTIONAL_ASSESSMENT: 0-10

## 2022-08-18 ASSESSMENT — PAIN SCALES - GENERAL
PAINLEVEL_OUTOF10: 7
PAINLEVEL_OUTOF10: 2
PAINLEVEL_OUTOF10: 2
PAINLEVEL_OUTOF10: 7
PAINLEVEL_OUTOF10: 6

## 2022-08-18 ASSESSMENT — PAIN DESCRIPTION - DESCRIPTORS
DESCRIPTORS: ACHING

## 2022-08-18 NOTE — DISCHARGE INSTRUCTIONS
Remain in splint. Tylenol as needed for pain. Norco in place of Tylenol for pain not controlled with Tylenol. May use ice for 5 to 10-minute intervals as desired for pain and swelling. Follow-up with orthopedics call today to schedule earliest available appointment. Return immediately for any acute concerns.

## 2022-08-18 NOTE — ED NOTES
Contacted chata Azul on call per Dr. [employer] request.     Nilo Median Reser  08/18/22 1007

## 2022-08-18 NOTE — ED PROVIDER NOTES
[facility] ED  EMERGENCY DEPARTMENT ENCOUNTER      Pt Name: Analilia Branch  MRN: [id number]  [place] 1940  Date of evaluation: 8/18/2022  Provider: Ezequiel Ortiz MD    [address] [place] [address]       Chief Complaint   Patient presents with    Wrist Injury     Left, fell last night at approx 2130, used arm to brace fall, denies other injury         HISTORY OF PRESENT ILLNESS   (Location/Symptom, Timing/Onset, Context/Setting, Quality, Duration, Modifying Factors, Severity)  Note limiting factors. Analilia Branch is a 80 y.o. female who presents to the emergency department      71-year-old female presented emergency department for evaluation of left wrist pain. Patient fell last night landing on outstretched arm. She not strike her head. No other injuries. No other focal pain complaints. No other acute concerns      Nursing Notes were reviewed. REVIEW OF SYSTEMS    (2-9 systems for level 4, 10 or more for level 5)     Review of Systems   All other systems reviewed and are negative. Except as noted above the remainder of the review of systems was reviewed and negative.        PAST MEDICAL HISTORY     Past Medical History:   Diagnosis Date    Hyperlipidemia 1994    Hypertension 1999    Osteoarthrosis     Spondylolisthesis     L4-5    Symptomatic menopausal or female climacteric states     Thoracic and lumbosacral neuritis 2012    right L5 region         SURGICAL HISTORY       Past Surgical History:   Procedure Laterality Date    COLONOSCOPY  02/07/2013    screening dr. Rober Fontanez, hemorrhoids    COLONOSCOPY  12/2008    1.5 cm adenomatous polyp Dr. Rocío Mercedes (CERVIX STATUS UNKNOWN)      MOHS SURGERY  2014    BCC L upper lip Dr Florez Dus       Discharge Medication List as of 8/18/2022 10:12 AM        CONTINUE these medications which have NOT CHANGED    Details   acetaminophen (TYLENOL) 325 MG tablet Take 650 mg by mouth every 6 hours as needed for PainHistorical Med      clotrimazole-betamethasone (LOTRISONE) 1-0.05 % cream Apply topically 2 times daily. , Disp-30 g, R-1, Normal      neomycin-polymyxin-dexamethasone (MAXITROL) 0.1 % ophthalmic suspension Apply 1 drop to eye as needed (Apply 1 drop to eye as needed PRN drops in ear) PRN drops in ear, Disp-5 mL, R-1Normal      losartan (COZAAR) 50 MG tablet TAKE 1 TABLET EVERY DAY, Disp-90 tablet, R-3Normal      rosuvastatin (CRESTOR) 10 MG tablet TAKE 1 TABLET EVERY WEEK, Disp-13 tablet, R-3Normal      metoprolol tartrate (LOPRESSOR) 25 MG tablet TAKE 1 TABLET EVERY DAY, Disp-90 tablet, R-3Normal      amLODIPine (NORVASC) 5 MG tablet TAKE 1 TABLET EVERY DAY, Disp-90 tablet, R-3Normal      multivitamin (THERAGRAN) per tablet Take 1 tablet by mouth daily. aspirin 81 MG tablet Take 81 mg by mouth as needed Historical Med             ALLERGIES     Patient has no known allergies. FAMILY HISTORY       Family History   Problem Relation Age of Onset    High Blood Pressure Mother     Cancer Mother     High Blood Pressure Father     Cancer Father           SOCIAL HISTORY       Social History     Socioeconomic History    Marital status:      Spouse name: None    Number of children: None    Years of education: None    Highest education level: None   Occupational History    Occupation: retired   Tobacco Use    Smoking status: Former     Packs/day: 0.50     Years: 10.00     Pack years: 5.00     Types: Cigarettes     Quit date: 1985     Years since quittin.6    Smokeless tobacco: Never   Substance and Sexual Activity    Alcohol use:  Yes     Alcohol/week: 0.0 standard drinks     Comment: occasional     Social Determinants of Health     Financial Resource Strain: Low Risk     Difficulty of Paying Living Expenses: Not hard at all   Food Insecurity: No Food Insecurity    Worried About Running Out of Food in the Last Year: Never true    Ran Out of Food in the Last Year: Never true       SCREENINGS        Sultana Coma Scale  Eye Opening: Spontaneous  Best Verbal Response: Oriented  Best Motor Response: Obeys commands  Sultana Coma Scale Score: 15               PHYSICAL EXAM    (up to 7 for level 4, 8 or more for level 5)     ED Triage Vitals   BP Temp Temp Source Heart Rate Resp SpO2 Height Weight   08/18/22 0832 08/18/22 0828 08/18/22 0828 08/18/22 0828 08/18/22 0828 08/18/22 0828 -- --   (!) 194/78 97.7 °F (36.5 °C) Tympanic 72 16 95 %         Physical Exam  Vitals and nursing note reviewed. Constitutional:       General: She is not in acute distress. Appearance: She is not toxic-appearing. HENT:      Head: Normocephalic and atraumatic. Cardiovascular:      Rate and Rhythm: Normal rate and regular rhythm. Pulmonary:      Effort: Pulmonary effort is normal. No respiratory distress. Breath sounds: Normal breath sounds. Musculoskeletal:      Comments: Left wrist tenderness and swelling. Some ecchymosis. Distal neurovascular intact. Otherwise unremarkable neuromuscular exam   Skin:     General: Skin is warm and dry. Findings: No rash. Neurological:      General: No focal deficit present. Mental Status: She is alert and oriented to person, place, and time. DIAGNOSTIC RESULTS     EKG: All EKG's are interpreted by the Emergency Department Physician who either signs or Co-signs this chart in the absence of a cardiologist.        RADIOLOGY:   Non-plain film images such as CT, Ultrasound and MRI are read by the radiologist. Plain radiographic images are visualized and preliminarily interpreted by the emergency physician with the below findings:        Interpretation per the Radiologist below, if available at the time of this note:    XR WRIST LEFT (MIN 3 VIEWS)   Final Result   Comminuted impacted fracture of the distal radial metaphysis.                ED BEDSIDE ULTRASOUND:   Performed by ED Physician - none    LABS:  Labs Reviewed - No data to display    All other labs were within normal range or not returned as of this dictation. EMERGENCY DEPARTMENT COURSE and DIFFERENTIAL DIAGNOSIS/MDM:   Vitals:    Vitals:    08/18/22 0828 08/18/22 0832 08/18/22 0939 08/18/22 1106   BP:  (!) 194/78 (!) 170/85 (!) 156/84   Pulse: 72   74   Resp: 16   16   Temp: 97.7 °F (36.5 °C)      TempSrc: Tympanic      SpO2: 95% 96%             MDM  Number of Diagnoses or Management Options  Closed fracture of distal end of left radius, unspecified fracture morphology, initial encounter  Diagnosis management comments: 16-year-old female comminuted distal radius fracture. Does not appear to require any reduction at this time. Plan to place. Spoke with Dr. Chip Jean. Patient will follow-up in his office. Patient updated on plan of care. He is return follow-up instructions discussed prior to discharge. MIPS       REASSESSMENT          CRITICAL CARE TIME   Total Critical Care time was  minutes, excluding separately reportable procedures. There was a high probability of clinically significant/life threatening deterioration in the patient's condition which required my urgent intervention. CONSULTS:  None    PROCEDURES:  Unless otherwise noted below, none     Procedures        FINAL IMPRESSION      1. Closed fracture of distal end of left radius, unspecified fracture morphology, initial encounter          DISPOSITION/PLAN   DISPOSITION Decision To Discharge 08/18/2022 10:09:39 AM      PATIENT REFERRED TO:  Renee Valencia MD  [address] [facility] [address] Aayush Welsh [address] [place] [address]  [phone]      Call today to schedule earliest available appointment      DISCHARGE MEDICATIONS:  Discharge Medication List as of 8/18/2022 10:12 AM        START taking these medications    Details   HYDROcodone-acetaminophen (NORCO) 5-325 MG per tablet Take 1 tablet by mouth every 6 hours as needed for Pain for up to 3 days. Intended supply: 3 days.  Take lowest dose possible to manage pain, Disp-12 tablet, R-0Normal           Controlled Substances Monitoring:     No flowsheet data found.     (Please note that portions of this note were completed with a voice recognition program.  Efforts were made to edit the dictations but occasionally words are mis-transcribed.)    Adelfo Mccormick MD (electronically signed)  Attending Emergency Physician             Adelfo Mccormick MD  08/29/22 Florentino Lombard, MD  08/29/22 0024

## 2022-12-27 RX ORDER — AMLODIPINE BESYLATE 5 MG/1
TABLET ORAL
Qty: 90 TABLET | Refills: 3 | Status: SHIPPED | OUTPATIENT
Start: 2022-12-27

## 2023-01-07 SDOH — HEALTH STABILITY: PHYSICAL HEALTH: ON AVERAGE, HOW MANY DAYS PER WEEK DO YOU ENGAGE IN MODERATE TO STRENUOUS EXERCISE (LIKE A BRISK WALK)?: 3 DAYS

## 2023-01-07 SDOH — HEALTH STABILITY: PHYSICAL HEALTH: ON AVERAGE, HOW MANY MINUTES DO YOU ENGAGE IN EXERCISE AT THIS LEVEL?: 30 MIN

## 2023-01-07 ASSESSMENT — LIFESTYLE VARIABLES
HOW MANY STANDARD DRINKS CONTAINING ALCOHOL DO YOU HAVE ON A TYPICAL DAY: 1
HOW OFTEN DO YOU HAVE A DRINK CONTAINING ALCOHOL: 2-3 TIMES A WEEK
HOW OFTEN DO YOU HAVE SIX OR MORE DRINKS ON ONE OCCASION: 1
HOW MANY STANDARD DRINKS CONTAINING ALCOHOL DO YOU HAVE ON A TYPICAL DAY: 1 OR 2
HOW OFTEN DO YOU HAVE A DRINK CONTAINING ALCOHOL: 4

## 2023-01-07 ASSESSMENT — PATIENT HEALTH QUESTIONNAIRE - PHQ9
SUM OF ALL RESPONSES TO PHQ QUESTIONS 1-9: 0
2. FEELING DOWN, DEPRESSED OR HOPELESS: 0
SUM OF ALL RESPONSES TO PHQ QUESTIONS 1-9: 0
1. LITTLE INTEREST OR PLEASURE IN DOING THINGS: 0
SUM OF ALL RESPONSES TO PHQ9 QUESTIONS 1 & 2: 0

## 2023-01-16 LAB
ALT SERPL-CCNC: 19 U/L (ref 5–40)
ANION GAP SERPL CALCULATED.3IONS-SCNC: 10 MEQ/L (ref 7–16)
AST SERPL-CCNC: 21 U/L (ref 9–40)
AVERAGE GLUCOSE: 123 MG/DL
BUN BLDV-MCNC: 13 MG/DL (ref 8–23)
CALCIUM SERPL-MCNC: 10.1 MG/DL (ref 8.5–10.5)
CHLORIDE BLD-SCNC: 105 MEQ/L (ref 95–107)
CHOLESTEROL/HDL RATIO: 3.8 RATIO
CHOLESTEROL: 203 MG/DL
CO2: 27 MEQ/L (ref 19–31)
CREAT SERPL-MCNC: 0.89 MG/DL (ref 0.6–1.3)
EGFR IF NONAFRICAN AMERICAN: 65 ML/MIN/1.73
GLUCOSE: 124 MG/DL (ref 70–99)
HBA1C MFR BLD: 5.9 % (ref 4.2–5.6)
HCT VFR BLD CALC: 43.1 % (ref 34–45)
HDLC SERPL-MCNC: 53 MG/DL
HEMOGLOBIN: 14.3 G/DL (ref 11.5–15.5)
LDL CHOLESTEROL CALCULATED: 116 MG/DL
LDL/HDL RATIO: 2.2 RATIO
MCH RBC QN AUTO: 30.4 PG (ref 25–33)
MCHC RBC AUTO-ENTMCNC: 33.2 G/DL (ref 31–36)
MCV RBC AUTO: 91.7 FL (ref 80–99)
PDW BLD-RTO: 13.2 % (ref 11.5–15)
PLATELETS: 188 K/UL (ref 130–400)
PMV BLD AUTO: 9.3 FL (ref 9.3–13)
POTASSIUM SERPL-SCNC: 3.9 MEQ/L (ref 3.5–5.4)
RBC: 4.7 M/UL (ref 3.8–5.4)
SODIUM BLD-SCNC: 142 MEQ/L (ref 133–146)
TRIGL SERPL-MCNC: 172 MG/DL
VITAMIN D 25-HYDROXY: 45 NG/ML
VLDLC SERPL CALC-MCNC: 34 MG/DL
WBC: 5.1 K/UL (ref 3.5–11)

## 2023-01-20 ENCOUNTER — OFFICE VISIT (OUTPATIENT)
Dept: PRIMARY CARE CLINIC | Age: 83
End: 2023-01-20
Payer: MEDICARE

## 2023-01-20 VITALS
TEMPERATURE: 97.5 F | HEIGHT: 65 IN | HEART RATE: 76 BPM | OXYGEN SATURATION: 98 % | SYSTOLIC BLOOD PRESSURE: 130 MMHG | DIASTOLIC BLOOD PRESSURE: 82 MMHG | WEIGHT: 168 LBS | RESPIRATION RATE: 18 BRPM | BODY MASS INDEX: 27.99 KG/M2

## 2023-01-20 DIAGNOSIS — R73.9 HYPERGLYCEMIA: ICD-10-CM

## 2023-01-20 DIAGNOSIS — Z78.0 POST-MENOPAUSAL: ICD-10-CM

## 2023-01-20 DIAGNOSIS — M85.80 OSTEOPENIA, UNSPECIFIED LOCATION: ICD-10-CM

## 2023-01-20 DIAGNOSIS — I10 ESSENTIAL HYPERTENSION: ICD-10-CM

## 2023-01-20 DIAGNOSIS — Z00.00 ENCOUNTER FOR MEDICARE ANNUAL WELLNESS EXAM: Primary | ICD-10-CM

## 2023-01-20 DIAGNOSIS — E78.5 HYPERLIPIDEMIA, UNSPECIFIED HYPERLIPIDEMIA TYPE: ICD-10-CM

## 2023-01-20 PROCEDURE — 90715 TDAP VACCINE 7 YRS/> IM: CPT | Performed by: NURSE PRACTITIONER

## 2023-01-20 PROCEDURE — 99211 OFF/OP EST MAY X REQ PHY/QHP: CPT | Performed by: NURSE PRACTITIONER

## 2023-01-20 RX ADMIN — TETANUS TOXOID, REDUCED DIPHTHERIA TOXOID AND ACELLULAR PERTUSSIS VACCINE, ADSORBED 0.5 ML: 5; 2.5; 8; 8; 2.5 SUSPENSION INTRAMUSCULAR at 15:33

## 2023-01-20 ASSESSMENT — PATIENT HEALTH QUESTIONNAIRE - PHQ9
2. FEELING DOWN, DEPRESSED OR HOPELESS: 0
SUM OF ALL RESPONSES TO PHQ9 QUESTIONS 1 & 2: 0
1. LITTLE INTEREST OR PLEASURE IN DOING THINGS: 0
SUM OF ALL RESPONSES TO PHQ QUESTIONS 1-9: 0

## 2023-01-20 ASSESSMENT — LIFESTYLE VARIABLES
HOW OFTEN DO YOU HAVE A DRINK CONTAINING ALCOHOL: 2-3 TIMES A WEEK
HOW MANY STANDARD DRINKS CONTAINING ALCOHOL DO YOU HAVE ON A TYPICAL DAY: 1 OR 2

## 2023-01-20 ASSESSMENT — ENCOUNTER SYMPTOMS
SINUS PRESSURE: 0
CONSTIPATION: 0
SHORTNESS OF BREATH: 0
SINUS PAIN: 0
RHINORRHEA: 0
DIARRHEA: 0
SORE THROAT: 0
WHEEZING: 0
COUGH: 0
ABDOMINAL PAIN: 0

## 2023-01-20 NOTE — PROGRESS NOTES
Medicare Annual Wellness Visit    [facility] is here for Medicare AWV (mcr)         Subjective     Wellness: The patient presents for wellness visit. She admits well balanced diet. For exercise she notes walking, yard work, and golf. She admits routine eye and dental exams. She is vaccinated for covid. She is UTD shingles, pneumococcal, flu vaccine. DEXA 1/2021 showed osteopenia. She was previously supplementing with calcium and Vit D but did not tolerate these well d/t side effects including constipation and upset stomach. She denies family history of breast cancer. Admits father with lung cancer that spread to the colon. Patient's complete Health Risk Assessment and screening values have been reviewed and are found in Flowsheets. The following problems were reviewed today and where indicated follow up appointments were made and/or referrals ordered. Positive Risk Factor Screenings with Interventions:    Fall Risk:  Do you feel unsteady or are you worried about falling? : (!) yes  2 or more falls in past year?: (!) yes  Fall with injury in past year?: (!) yes     Interventions:    Admits recent falls after tripping on a chair. She fractured her left wrist and followed with Dr. Darolyn Gowers (ortho)            General HRA Questions:  Select all that apply: (!) Stress       Safety:  Do you have any tripping hazards - loose or unsecured carpets or rugs?: (!) Yes  Do you have either shower bars, grab bars, non-slip mats or non-slip surfaces in your shower or bathtub?: (!) No       Review of Systems   Constitutional:  Negative for chills, fatigue, fever and unexpected weight change. HENT:  Negative for congestion, rhinorrhea, sinus pressure, sinus pain and sore throat. Admits chronic ear dryness and pruritis that several times has caused \"ear cellulitis\". She is using maxitrol ear drops PRN with benefit. Eyes:  Negative for visual disturbance.    Respiratory:  Negative for cough, shortness of breath and wheezing. Cardiovascular:  Negative for chest pain and palpitations. Gastrointestinal:  Negative for abdominal pain, constipation and diarrhea. Genitourinary:  Negative for difficulty urinating. Skin:  Negative for rash. Neurological:  Negative for dizziness, light-headedness and headaches. Objective   Vitals:    01/20/23 0846   BP: 130/82   Pulse: 76   Resp: 18   Temp: 97.5 °F (36.4 °C)   SpO2: 98%   Weight: 168 lb (76.2 kg)   Height: 5' 5\" (1.651 m)      Body mass index is 27.96 kg/m². Physical Exam  Constitutional:       General: She is not in acute distress. Appearance: Normal appearance. She is normal weight. She is not ill-appearing or toxic-appearing. HENT:      Right Ear: Tympanic membrane, ear canal and external ear normal. There is no impacted cerumen. Left Ear: Tympanic membrane, ear canal and external ear normal. There is no impacted cerumen. Nose: Nose normal. No congestion or rhinorrhea. Mouth/Throat:      Mouth: Mucous membranes are moist.      Pharynx: No oropharyngeal exudate or posterior oropharyngeal erythema. Cardiovascular:      Rate and Rhythm: Normal rate and regular rhythm. Heart sounds: Normal heart sounds. No murmur heard. Pulmonary:      Effort: Pulmonary effort is normal. No respiratory distress. Breath sounds: Normal breath sounds. No stridor. No wheezing, rhonchi or rales. Abdominal:      General: Bowel sounds are normal. There is no distension. Palpations: Abdomen is soft. There is no mass. Tenderness: There is no abdominal tenderness. There is no guarding. Hernia: No hernia is present. Lymphadenopathy:      Cervical: No cervical adenopathy. Skin:     General: Skin is warm. Neurological:      Mental Status: She is alert and oriented to person, place, and time. Psychiatric:         Mood and Affect: Mood normal.         Behavior: Behavior normal.         Thought Content:  Thought content normal. Judgment: Judgment normal.         No Known Allergies  Prior to Visit Medications    Medication Sig Taking? Authorizing Provider   metoprolol tartrate (LOPRESSOR) 25 MG tablet TAKE 1 TABLET BY MOUTH ONCE DAILY Yes JAMAR Yoder CNP   amLODIPine (NORVASC) 5 MG tablet TAKE 1 TABLET BY MOUTH ONCE DAILY Yes JAMAR Yoder CNP   losartan (COZAAR) 50 MG tablet TAKE 1 TABLET EVERY DAY Yes Aliya Gonzalez MD   rosuvastatin (CRESTOR) 10 MG tablet TAKE 1 TABLET EVERY WEEK Yes Aliya Gonzalez MD   acetaminophen (TYLENOL) 325 MG tablet Take 650 mg by mouth every 6 hours as needed for Pain Yes Historical Provider, MD   clotrimazole-betamethasone (LOTRISONE) 1-0.05 % cream Apply topically 2 times daily. Yes Aliya Gonzalez MD   neomycin-polymyxin-dexamethasone (MAXITROL) 0.1 % ophthalmic suspension Apply 1 drop to eye as needed (Apply 1 drop to eye as needed PRN drops in ear) PRN drops in ear Yes Aliya Gonzalez MD   multivitamin [facility]) per tablet Take 1 tablet by mouth daily. Yes Historical Provider, MD   aspirin 81 MG tablet Take 81 mg by mouth as needed  Yes Historical Provider, MD     Assessment:     Diagnosis Orders   1. Encounter for Medicare annual wellness exam        2. Osteopenia, unspecified location  DEXA BONE DENSITY 2 SITES    Vitamin D 25 Hydroxy      3. Post-menopausal  DEXA BONE DENSITY 2 SITES      4. Essential hypertension  Comprehensive Metabolic Panel    CBC      5. Hyperlipidemia, unspecified hyperlipidemia type  Lipid Panel      6. Hyperglycemia  Hemoglobin A1C        Plan:    Wellness:  - Counseled on well-balanced diet and physical activity as tolerated  - Encouraged routine eye and dental exams  - Tetanus vaccine administered today in office  - UTD pneumococcal, shingles, flu vaccine  - Mini cog completed today in office, score 5/5  - Reviewed Medicare wellness paperwork.   She confirms living will in place  - Following with dermatologist, Dr. Margarito Rubio routinely  - Reviewed lab work 1/2023, mostly unremarkable aside from mildly elevated cholesterol    Osteopenia:  - DEXA scan 1/2021 shows osteopenia  - Patient unable to tolerate vitamin D and calcium due to side effects  - Vitamin D 1/2023 WNL  - Repeat DEXA scan ordered    Hyperlipidemia:  - Reviewed lipid panel 1/2023 showing mildly elevated LDL, total cholesterol, triglycerides  - Patient currently taking rosuvastatin 10 mg daily once weekly due to side effects  - Encourage patient to increase to twice weekly if tolerated    Chronic otitis externa:  - Patient is using neomycin-polymyxin-dexamethasone 0.1% ophthalmic suspension and bilateral ear canals as needed with great benefit.   - Ear exam WNL today, I am fine if she continues this as needed regimen.  - Notify office with any concerns or when refill needed    CareTeam (Including outside providers/suppliers regularly involved in providing care):   Patient Care Team:  JAMAR Chang CNP as PCP - General (Family Nurse Practitioner)  JAMAR Chang CNP as PCP - [facility] Empaneled Provider  JAMAR Chang CNP (Family Nurse Practitioner)  Sola Lentz MD as Consulting Physician [facility])     Reviewed and updated this visit:  Allergies  Meds

## 2023-01-20 NOTE — PATIENT INSTRUCTIONS
SURVEY:    You may be receiving a survey from [employer] regarding your visit today. Please complete the survey to enable us to provide the highest quality of care to you and your family. If you cannot score us a very good on any question, please call the office to discuss how we could of made your experience a very good one. Thank you.

## 2023-03-08 ENCOUNTER — HOSPITAL ENCOUNTER (OUTPATIENT)
Dept: WOMENS IMAGING | Age: 83
Discharge: HOME | End: 2023-03-10
Payer: MEDICARE

## 2023-03-08 DIAGNOSIS — Z78.0 POST-MENOPAUSAL: ICD-10-CM

## 2023-03-08 DIAGNOSIS — M85.80 OSTEOPENIA, UNSPECIFIED LOCATION: ICD-10-CM

## 2023-03-08 PROCEDURE — 77080 DXA BONE DENSITY AXIAL: CPT

## 2023-07-12 LAB
ALBUMIN SERPL-MCNC: 4.4 G/DL (ref 3.5–5.2)
ALK PHOSPHATASE: 104 U/L (ref 40–142)
ALT SERPL-CCNC: 16 U/L (ref 5–40)
ANION GAP SERPL CALCULATED.3IONS-SCNC: 11 MEQ/L (ref 7–16)
AST SERPL-CCNC: 19 U/L (ref 9–40)
AVERAGE GLUCOSE: 120 MG/DL
BILIRUB SERPL-MCNC: 0.5 MG/DL
BUN BLDV-MCNC: 14 MG/DL (ref 8–23)
CALCIUM SERPL-MCNC: 9.9 MG/DL (ref 8.5–10.5)
CHLORIDE BLD-SCNC: 105 MEQ/L (ref 95–107)
CHOLESTEROL/HDL RATIO: 4.2 RATIO
CHOLESTEROL: 200 MG/DL
CO2: 26 MEQ/L (ref 19–31)
CREAT SERPL-MCNC: 0.84 MG/DL (ref 0.6–1.3)
EGFR IF NONAFRICAN AMERICAN: 70 ML/MIN/1.73
GLUCOSE: 118 MG/DL (ref 70–99)
HBA1C MFR BLD: 5.8 % (ref 4.2–5.6)
HCT VFR BLD CALC: 39.6 % (ref 34–45)
HDLC SERPL-MCNC: 48 MG/DL
HEMOGLOBIN: 13.6 G/DL (ref 11.5–15.5)
LDL CHOLESTEROL CALCULATED: 111 MG/DL
LDL/HDL RATIO: 2.3 RATIO
MCH RBC QN AUTO: 31.5 PG (ref 25–33)
MCHC RBC AUTO-ENTMCNC: 34.3 G/DL (ref 31–36)
MCV RBC AUTO: 91.7 FL (ref 80–99)
PDW BLD-RTO: 13.1 % (ref 11.5–15)
PLATELETS: 187 K/UL (ref 130–400)
PMV BLD AUTO: 9.3 FL (ref 9.3–13)
POTASSIUM SERPL-SCNC: 4.2 MEQ/L (ref 3.5–5.4)
RBC: 4.32 M/UL (ref 3.8–5.4)
SODIUM BLD-SCNC: 142 MEQ/L (ref 133–146)
TOTAL PROTEIN: 6.7 G/DL (ref 6.1–8.3)
TRIGL SERPL-MCNC: 203 MG/DL
VITAMIN D 25-HYDROXY: 50 NG/ML
VLDLC SERPL CALC-MCNC: 41 MG/DL
WBC: 4.7 K/UL (ref 3.5–11)

## 2023-07-20 ENCOUNTER — OFFICE VISIT (OUTPATIENT)
Dept: PRIMARY CARE CLINIC | Age: 83
End: 2023-07-20
Payer: MEDICARE

## 2023-07-20 VITALS
BODY MASS INDEX: 27.66 KG/M2 | HEIGHT: 65 IN | WEIGHT: 166 LBS | TEMPERATURE: 97.3 F | HEART RATE: 64 BPM | DIASTOLIC BLOOD PRESSURE: 90 MMHG | OXYGEN SATURATION: 96 % | SYSTOLIC BLOOD PRESSURE: 172 MMHG

## 2023-07-20 DIAGNOSIS — I10 HYPERTENSION, ESSENTIAL: ICD-10-CM

## 2023-07-20 DIAGNOSIS — I10 ESSENTIAL HYPERTENSION: ICD-10-CM

## 2023-07-20 DIAGNOSIS — E78.49 OTHER HYPERLIPIDEMIA: Primary | ICD-10-CM

## 2023-07-20 DIAGNOSIS — E78.5 HYPERLIPIDEMIA, UNSPECIFIED HYPERLIPIDEMIA TYPE: ICD-10-CM

## 2023-07-20 DIAGNOSIS — M85.80 OSTEOPENIA, UNSPECIFIED LOCATION: ICD-10-CM

## 2023-07-20 DIAGNOSIS — H60.8X9: ICD-10-CM

## 2023-07-20 DIAGNOSIS — R73.03 PREDIABETES: ICD-10-CM

## 2023-07-20 DIAGNOSIS — H61.23 IMPACTED CERUMEN, BILATERAL: ICD-10-CM

## 2023-07-20 PROCEDURE — 1090F PRES/ABSN URINE INCON ASSESS: CPT | Performed by: NURSE PRACTITIONER

## 2023-07-20 PROCEDURE — PBSHW PBB SHADOW CHARGE: Performed by: NURSE PRACTITIONER

## 2023-07-20 PROCEDURE — 99211 OFF/OP EST MAY X REQ PHY/QHP: CPT | Performed by: NURSE PRACTITIONER

## 2023-07-20 PROCEDURE — 3077F SYST BP >= 140 MM HG: CPT | Performed by: NURSE PRACTITIONER

## 2023-07-20 PROCEDURE — 1123F ACP DISCUSS/DSCN MKR DOCD: CPT | Performed by: NURSE PRACTITIONER

## 2023-07-20 PROCEDURE — 1036F TOBACCO NON-USER: CPT | Performed by: NURSE PRACTITIONER

## 2023-07-20 PROCEDURE — 3080F DIAST BP >= 90 MM HG: CPT | Performed by: NURSE PRACTITIONER

## 2023-07-20 PROCEDURE — 99214 OFFICE O/P EST MOD 30 MIN: CPT | Performed by: NURSE PRACTITIONER

## 2023-07-20 PROCEDURE — G8399 PT W/DXA RESULTS DOCUMENT: HCPCS | Performed by: NURSE PRACTITIONER

## 2023-07-20 PROCEDURE — G8417 CALC BMI ABV UP PARAM F/U: HCPCS | Performed by: NURSE PRACTITIONER

## 2023-07-20 PROCEDURE — G8427 DOCREV CUR MEDS BY ELIG CLIN: HCPCS | Performed by: NURSE PRACTITIONER

## 2023-07-20 RX ORDER — NEOMYCIN POLYMYXIN B SULFATES AND DEXAMETHASONE 3.5; 10000; 1 MG/ML; [USP'U]/ML; MG/ML
1 SUSPENSION/ DROPS OPHTHALMIC PRN
Qty: 5 ML | Refills: 1 | Status: CANCELLED | OUTPATIENT
Start: 2023-07-20

## 2023-07-20 SDOH — ECONOMIC STABILITY: FOOD INSECURITY: WITHIN THE PAST 12 MONTHS, YOU WORRIED THAT YOUR FOOD WOULD RUN OUT BEFORE YOU GOT MONEY TO BUY MORE.: NEVER TRUE

## 2023-07-20 SDOH — ECONOMIC STABILITY: FOOD INSECURITY: WITHIN THE PAST 12 MONTHS, THE FOOD YOU BOUGHT JUST DIDN'T LAST AND YOU DIDN'T HAVE MONEY TO GET MORE.: NEVER TRUE

## 2023-07-20 SDOH — ECONOMIC STABILITY: HOUSING INSECURITY
IN THE LAST 12 MONTHS, WAS THERE A TIME WHEN YOU DID NOT HAVE A STEADY PLACE TO SLEEP OR SLEPT IN A SHELTER (INCLUDING NOW)?: NO

## 2023-07-20 SDOH — ECONOMIC STABILITY: INCOME INSECURITY: HOW HARD IS IT FOR YOU TO PAY FOR THE VERY BASICS LIKE FOOD, HOUSING, MEDICAL CARE, AND HEATING?: NOT HARD AT ALL

## 2023-07-20 ASSESSMENT — ENCOUNTER SYMPTOMS: SHORTNESS OF BREATH: 0

## 2023-07-20 NOTE — PATIENT INSTRUCTIONS
Vitamin D - 1000 units daily   Monitor blood pressure at home for 2 weeks and then please call office and report these readings or drop off copy of readings for Stella Alvarado to review     SURVEY:    You may be receiving a survey from [employer] regarding your visit today. Please complete the survey to enable us to provide the highest quality of care to you and your family. If you cannot score us a very good on any question, please call the office to discuss how we could of made your experience a very good one. Thank you.

## 2023-07-20 NOTE — PROGRESS NOTES
Name: Juan Harvey  : 1940         Chief Complaint:     Chief Complaint   Patient presents with    Other     -Osteopenia  -Chronic Otitis, denies any issue at this time, she uses the drops PRN    Hyperlipidemia     -Recent labs 23 Vit D WNL, A1c 5.8%, Chol 200, Tri 203, HDL 48, . Current treatment Crestor 10 mg         History of Present Illness:      Juan Harvey is a 82 y.o.  female who presents with Other (-Osteopenia/-Chronic Otitis, denies any issue at this time, she uses the drops PRN) and Hyperlipidemia (-Recent labs 23 Vit D WNL, A1c 5.8%, Chol 200, Tri 203, HDL 48, . Current treatment Crestor 10 mg/)      HPI    Hypertension:  Current treatment includes amlodipine 5mg QD, losartan 50mg QD, and metoprolol tartrate 25mg QD. She admits monitoring her blood pressure at home and this averages 130/80. She denies chest pain, SOB, lightheadedness, dizziness, or headaches. She does have hx of white coat hypertension. Osteopenia:  Current treatment includes calcium PRN (no QD dosing d/t constipation side effects). She does not supplement with vitamin D. DEXA 3/2023 shows osteopenia. Hyperlipidemia:  Current treatment includes rosuvastatin 10mg QD. She admits low cholesterol diet, but does eat increased amounts of ice cream. She has increased water intake. For exercise she notes golfing. Chronic Otitis Externa:  Using neomycin-polymyxin, dexamethasone 0.1% ophthalmic suspension in the ear as needed which helps to control itching.     Past Medical History:     Past Medical History:   Diagnosis Date    Hyperlipidemia     Hypertension     Osteoarthrosis     Spondylolisthesis     L4-5    Symptomatic menopausal or female climacteric states     Thoracic and lumbosacral neuritis 2012    right L5 region      Reviewed all health maintenance requirements and ordered appropriate tests  Health Maintenance Due   Topic Date Due    COVID-19 Vaccine (4 - Booster for Moderna series)

## 2023-07-21 ENCOUNTER — TELEPHONE (OUTPATIENT)
Dept: PRIMARY CARE CLINIC | Age: 83
End: 2023-07-21

## 2023-07-21 NOTE — TELEPHONE ENCOUNTER
----- Message from Saba Fuller sent at 7/21/2023  9:03 AM EDT -----  Subject: Message to Provider    QUESTIONS  Information for Provider? Pt calling to reschedule appt for 7/27 for   Bilateral ear irrigation Pt will be out of town until 8/1, would like to   have an appt anytime starting 8/2. Please call to reschedule appt.  ---------------------------------------------------------------------------  --------------  Wanda Gloss INFO  [phone]; OK to leave message on voicemail  ---------------------------------------------------------------------------  --------------  SCRIPT ANSWERS  Relationship to Patient? Self  \"Have your symptoms changed? \": (If routine visit such as AWV, Physical or   PAP then answer no)? No  Did you have an injury or trauma within the past three days? No  Are you experiencing new onset hearing loss? No  Are you having fevers (100.4), chills or sweats? No  (Is the patient requesting to see the provider for a procedure?)?  Yes

## 2023-08-04 ENCOUNTER — NURSE ONLY (OUTPATIENT)
Dept: PRIMARY CARE CLINIC | Age: 83
End: 2023-08-04
Payer: MEDICARE

## 2023-08-04 DIAGNOSIS — H61.23 BILATERAL IMPACTED CERUMEN: Primary | ICD-10-CM

## 2023-08-04 PROCEDURE — 69210 REMOVE IMPACTED EAR WAX UNI: CPT | Performed by: NURSE PRACTITIONER

## 2023-08-04 SDOH — ECONOMIC STABILITY: FOOD INSECURITY: WITHIN THE PAST 12 MONTHS, THE FOOD YOU BOUGHT JUST DIDN'T LAST AND YOU DIDN'T HAVE MONEY TO GET MORE.: NEVER TRUE

## 2023-08-04 SDOH — ECONOMIC STABILITY: FOOD INSECURITY: WITHIN THE PAST 12 MONTHS, YOU WORRIED THAT YOUR FOOD WOULD RUN OUT BEFORE YOU GOT MONEY TO BUY MORE.: NEVER TRUE

## 2023-08-04 SDOH — ECONOMIC STABILITY: INCOME INSECURITY: HOW HARD IS IT FOR YOU TO PAY FOR THE VERY BASICS LIKE FOOD, HOUSING, MEDICAL CARE, AND HEATING?: NOT HARD AT ALL

## 2023-08-04 NOTE — PATIENT INSTRUCTIONS
SURVEY:    You may be receiving a survey from [employer] regarding your visit today. Please complete the survey to enable us to provide the highest quality of care to you and your family. If you cannot score us a very good on any question, please call the office to discuss how we could of made your experience a very good one. Thank you.

## 2023-08-04 NOTE — PROGRESS NOTES
[facility] [address]  Dept: [phone]    Ear Cerumen Removal Procedure Note  Indication: ear cerumen impaction    Procedure: After placing the patient's head in the appropriate position, the patient's                                                                          BILATERAL ear canal was irrigated with the appropriate solution until all cerumen was removed and the ear canal was not clear. Sunita MELGAR irrigated left ear but unable to completely clear the canal.     The patient tolerated the procedure with difficulty. Marii Urbano examined the patient's ears post irrigation confirming that the canals are not clear bilaterally.

## 2023-11-14 ENCOUNTER — OFFICE VISIT (OUTPATIENT)
Dept: PRIMARY CARE CLINIC | Age: 83
End: 2023-11-14
Payer: MEDICARE

## 2023-11-14 VITALS
OXYGEN SATURATION: 97 % | HEIGHT: 65 IN | WEIGHT: 165 LBS | BODY MASS INDEX: 27.49 KG/M2 | SYSTOLIC BLOOD PRESSURE: 170 MMHG | HEART RATE: 75 BPM | TEMPERATURE: 97.1 F | DIASTOLIC BLOOD PRESSURE: 90 MMHG

## 2023-11-14 DIAGNOSIS — J06.9 BACTERIAL URI: Primary | ICD-10-CM

## 2023-11-14 DIAGNOSIS — B96.89 BACTERIAL URI: Primary | ICD-10-CM

## 2023-11-14 PROCEDURE — 1123F ACP DISCUSS/DSCN MKR DOCD: CPT | Performed by: NURSE PRACTITIONER

## 2023-11-14 PROCEDURE — G8399 PT W/DXA RESULTS DOCUMENT: HCPCS | Performed by: NURSE PRACTITIONER

## 2023-11-14 PROCEDURE — G8484 FLU IMMUNIZE NO ADMIN: HCPCS | Performed by: NURSE PRACTITIONER

## 2023-11-14 PROCEDURE — 1090F PRES/ABSN URINE INCON ASSESS: CPT | Performed by: NURSE PRACTITIONER

## 2023-11-14 PROCEDURE — 3077F SYST BP >= 140 MM HG: CPT | Performed by: NURSE PRACTITIONER

## 2023-11-14 PROCEDURE — G8417 CALC BMI ABV UP PARAM F/U: HCPCS | Performed by: NURSE PRACTITIONER

## 2023-11-14 PROCEDURE — 3080F DIAST BP >= 90 MM HG: CPT | Performed by: NURSE PRACTITIONER

## 2023-11-14 PROCEDURE — 99213 OFFICE O/P EST LOW 20 MIN: CPT | Performed by: NURSE PRACTITIONER

## 2023-11-14 PROCEDURE — G8427 DOCREV CUR MEDS BY ELIG CLIN: HCPCS | Performed by: NURSE PRACTITIONER

## 2023-11-14 PROCEDURE — 1036F TOBACCO NON-USER: CPT | Performed by: NURSE PRACTITIONER

## 2023-11-14 RX ORDER — DOXYCYCLINE HYCLATE 100 MG
100 TABLET ORAL 2 TIMES DAILY
Qty: 14 TABLET | Refills: 0 | Status: SHIPPED | OUTPATIENT
Start: 2023-11-14 | End: 2023-11-21

## 2023-11-14 ASSESSMENT — ENCOUNTER SYMPTOMS
COUGH: 1
RHINORRHEA: 1

## 2023-11-14 NOTE — PATIENT INSTRUCTIONS
SURVEY:    You may be receiving a survey from [employer] regarding your visit today. Please complete the survey to enable us to provide the highest quality of care to you and your family. If you cannot score us a very good on any question, please call the office to discuss how we could have made your experience a very good one. Thank you.

## 2024-01-17 SDOH — ECONOMIC STABILITY: TRANSPORTATION INSECURITY
IN THE PAST 12 MONTHS, HAS LACK OF TRANSPORTATION KEPT YOU FROM MEETINGS, WORK, OR FROM GETTING THINGS NEEDED FOR DAILY LIVING?: NO

## 2024-01-17 SDOH — ECONOMIC STABILITY: FOOD INSECURITY: WITHIN THE PAST 12 MONTHS, YOU WORRIED THAT YOUR FOOD WOULD RUN OUT BEFORE YOU GOT MONEY TO BUY MORE.: NEVER TRUE

## 2024-01-17 SDOH — HEALTH STABILITY: PHYSICAL HEALTH: ON AVERAGE, HOW MANY MINUTES DO YOU ENGAGE IN EXERCISE AT THIS LEVEL?: 20 MIN

## 2024-01-17 SDOH — ECONOMIC STABILITY: INCOME INSECURITY: HOW HARD IS IT FOR YOU TO PAY FOR THE VERY BASICS LIKE FOOD, HOUSING, MEDICAL CARE, AND HEATING?: NOT HARD AT ALL

## 2024-01-17 SDOH — HEALTH STABILITY: PHYSICAL HEALTH: ON AVERAGE, HOW MANY DAYS PER WEEK DO YOU ENGAGE IN MODERATE TO STRENUOUS EXERCISE (LIKE A BRISK WALK)?: 2 DAYS

## 2024-01-17 SDOH — ECONOMIC STABILITY: FOOD INSECURITY: WITHIN THE PAST 12 MONTHS, THE FOOD YOU BOUGHT JUST DIDN'T LAST AND YOU DIDN'T HAVE MONEY TO GET MORE.: NEVER TRUE

## 2024-01-17 ASSESSMENT — LIFESTYLE VARIABLES
HOW OFTEN DO YOU HAVE A DRINK CONTAINING ALCOHOL: 4
HOW MANY STANDARD DRINKS CONTAINING ALCOHOL DO YOU HAVE ON A TYPICAL DAY: 1
HOW OFTEN DO YOU HAVE SIX OR MORE DRINKS ON ONE OCCASION: 1
HOW OFTEN DO YOU HAVE A DRINK CONTAINING ALCOHOL: 2-3 TIMES A WEEK
HOW MANY STANDARD DRINKS CONTAINING ALCOHOL DO YOU HAVE ON A TYPICAL DAY: 1 OR 2

## 2024-01-17 ASSESSMENT — PATIENT HEALTH QUESTIONNAIRE - PHQ9
2. FEELING DOWN, DEPRESSED OR HOPELESS: 0
SUM OF ALL RESPONSES TO PHQ QUESTIONS 1-9: 0
1. LITTLE INTEREST OR PLEASURE IN DOING THINGS: 0
SUM OF ALL RESPONSES TO PHQ9 QUESTIONS 1 & 2: 0
SUM OF ALL RESPONSES TO PHQ QUESTIONS 1-9: 0

## 2024-01-18 LAB
ALBUMIN SERPL-MCNC: 4.6 G/DL (ref 3.5–5.2)
ALK PHOSPHATASE: 93 U/L (ref 40–142)
ALT SERPL-CCNC: 15 U/L (ref 5–40)
ANION GAP SERPL CALCULATED.3IONS-SCNC: 10 MEQ/L (ref 7–16)
AST SERPL-CCNC: 19 U/L (ref 9–40)
AVERAGE GLUCOSE: 120 MG/DL
BILIRUB SERPL-MCNC: 0.6 MG/DL
BUN BLDV-MCNC: 14 MG/DL (ref 8–23)
CALCIUM SERPL-MCNC: 10.2 MG/DL (ref 8.5–10.5)
CHLORIDE BLD-SCNC: 105 MEQ/L (ref 95–107)
CHOLESTEROL/HDL RATIO: 3.7 RATIO
CHOLESTEROL: 187 MG/DL
CO2: 29 MEQ/L (ref 19–31)
CREAT SERPL-MCNC: 0.87 MG/DL (ref 0.6–1.3)
EGFR IF NONAFRICAN AMERICAN: 67 ML/MIN/1.73
GLUCOSE: 119 MG/DL (ref 70–99)
HBA1C MFR BLD: 5.8 % (ref 4.2–5.6)
HCT VFR BLD CALC: 40.6 % (ref 34–45)
HDLC SERPL-MCNC: 51 MG/DL
HEMOGLOBIN: 13.7 G/DL (ref 11.5–15.5)
LDL CHOLESTEROL CALCULATED: 103 MG/DL
LDL/HDL RATIO: 2 RATIO
MCH RBC QN AUTO: 30.5 PG (ref 25–33)
MCHC RBC AUTO-ENTMCNC: 33.7 G/DL (ref 31–36)
MCV RBC AUTO: 90.4 FL (ref 80–99)
PDW BLD-RTO: 13.2 % (ref 11.5–15)
PLATELETS: 209 K/UL (ref 130–400)
PMV BLD AUTO: 8.9 FL (ref 9.3–13)
POTASSIUM SERPL-SCNC: 4.2 MEQ/L (ref 3.5–5.4)
RBC: 4.49 M/UL (ref 3.8–5.4)
SODIUM BLD-SCNC: 144 MEQ/L (ref 133–146)
TOTAL PROTEIN: 6.9 G/DL (ref 6.1–8.3)
TRIGL SERPL-MCNC: 165 MG/DL
VITAMIN D 25-HYDROXY: 58 NG/ML
VLDLC SERPL CALC-MCNC: 33 MG/DL
WBC: 5.3 K/UL (ref 3.5–11)

## 2024-01-24 ENCOUNTER — OFFICE VISIT (OUTPATIENT)
Dept: PRIMARY CARE CLINIC | Age: 84
End: 2024-01-24
Payer: MEDICARE

## 2024-01-24 VITALS
OXYGEN SATURATION: 97 % | DIASTOLIC BLOOD PRESSURE: 102 MMHG | HEART RATE: 73 BPM | WEIGHT: 168 LBS | BODY MASS INDEX: 27.99 KG/M2 | HEIGHT: 65 IN | SYSTOLIC BLOOD PRESSURE: 180 MMHG

## 2024-01-24 DIAGNOSIS — R73.9 HYPERGLYCEMIA: ICD-10-CM

## 2024-01-24 DIAGNOSIS — Z00.00 MEDICARE ANNUAL WELLNESS VISIT, SUBSEQUENT: Primary | ICD-10-CM

## 2024-01-24 DIAGNOSIS — M85.80 OSTEOPENIA, UNSPECIFIED LOCATION: ICD-10-CM

## 2024-01-24 DIAGNOSIS — E78.49 OTHER HYPERLIPIDEMIA: ICD-10-CM

## 2024-01-24 DIAGNOSIS — I10 HYPERTENSION, ESSENTIAL: ICD-10-CM

## 2024-01-24 PROCEDURE — G8484 FLU IMMUNIZE NO ADMIN: HCPCS | Performed by: NURSE PRACTITIONER

## 2024-01-24 PROCEDURE — 3080F DIAST BP >= 90 MM HG: CPT | Performed by: NURSE PRACTITIONER

## 2024-01-24 PROCEDURE — G0439 PPPS, SUBSEQ VISIT: HCPCS | Performed by: NURSE PRACTITIONER

## 2024-01-24 PROCEDURE — 3077F SYST BP >= 140 MM HG: CPT | Performed by: NURSE PRACTITIONER

## 2024-01-24 PROCEDURE — 1123F ACP DISCUSS/DSCN MKR DOCD: CPT | Performed by: NURSE PRACTITIONER

## 2024-01-24 RX ORDER — LOSARTAN POTASSIUM 50 MG/1
100 TABLET ORAL DAILY
Qty: 90 TABLET | Refills: 3
Start: 2024-01-24

## 2024-01-24 SDOH — ECONOMIC STABILITY: FOOD INSECURITY: WITHIN THE PAST 12 MONTHS, YOU WORRIED THAT YOUR FOOD WOULD RUN OUT BEFORE YOU GOT MONEY TO BUY MORE.: NEVER TRUE

## 2024-01-24 SDOH — ECONOMIC STABILITY: INCOME INSECURITY: HOW HARD IS IT FOR YOU TO PAY FOR THE VERY BASICS LIKE FOOD, HOUSING, MEDICAL CARE, AND HEATING?: NOT HARD AT ALL

## 2024-01-24 SDOH — ECONOMIC STABILITY: FOOD INSECURITY: WITHIN THE PAST 12 MONTHS, THE FOOD YOU BOUGHT JUST DIDN'T LAST AND YOU DIDN'T HAVE MONEY TO GET MORE.: NEVER TRUE

## 2024-01-24 ASSESSMENT — ENCOUNTER SYMPTOMS
SHORTNESS OF BREATH: 0
DIARRHEA: 0
CONSTIPATION: 0

## 2024-01-24 NOTE — PROGRESS NOTES
external ear normal. There is no impacted cerumen.      Nose: Nose normal. No congestion or rhinorrhea.      Mouth/Throat:      Mouth: Mucous membranes are moist.      Pharynx: No oropharyngeal exudate or posterior oropharyngeal erythema.   Cardiovascular:      Rate and Rhythm: Normal rate and regular rhythm.      Heart sounds: Normal heart sounds. No murmur heard.  Pulmonary:      Effort: Pulmonary effort is normal. No respiratory distress.      Breath sounds: Normal breath sounds. No stridor. No wheezing, rhonchi or rales.   Abdominal:      General: Bowel sounds are normal. There is no distension.      Palpations: Abdomen is soft. There is no mass.      Tenderness: There is no abdominal tenderness. There is no guarding.      Hernia: No hernia is present.   Lymphadenopathy:      Cervical: No cervical adenopathy.   Neurological:      Mental Status: She is alert.   Psychiatric:         Mood and Affect: Mood normal.         Behavior: Behavior normal.         Thought Content: Thought content normal.         Judgment: Judgment normal.              No Known Allergies  Prior to Visit Medications    Medication Sig Taking? Authorizing Provider   losartan (COZAAR) 50 MG tablet Take 2 tablets by mouth daily Yes Brandi Tian APRN - CNP   metoprolol tartrate (LOPRESSOR) 25 MG tablet TAKE 1 TABLET BY MOUTH ONCE DAILY Yes Brandi Tian APRN - CNP   amLODIPine (NORVASC) 5 MG tablet TAKE 1 TABLET BY MOUTH ONCE DAILY Yes Brandi Tian APRN - CNP   rosuvastatin (CRESTOR) 10 MG tablet TAKE 1 TABLET EVERY WEEK Yes Ever Bolanos MD   acetaminophen (TYLENOL) 325 MG tablet Take 2 tablets by mouth every 6 hours as needed for Pain Yes Provider, MD Lauren   clotrimazole-betamethasone (LOTRISONE) 1-0.05 % cream Apply topically 2 times daily. Yes Ever Bolanos MD   neomycin-polymyxin-dexamethasone (MAXITROL) 0.1 % ophthalmic suspension Apply 1 drop to eye as needed (Apply 1 drop to eye as needed PRN drops in ear) PRN

## 2024-02-21 ENCOUNTER — TELEPHONE (OUTPATIENT)
Dept: PRIMARY CARE CLINIC | Age: 84
End: 2024-02-21

## 2024-03-08 RX ORDER — AMLODIPINE BESYLATE 5 MG/1
TABLET ORAL
Qty: 90 TABLET | Refills: 3 | Status: SHIPPED | OUTPATIENT
Start: 2024-03-08

## 2024-03-11 DIAGNOSIS — I10 HYPERTENSION, ESSENTIAL: ICD-10-CM

## 2024-03-11 RX ORDER — LOSARTAN POTASSIUM 100 MG/1
100 TABLET ORAL DAILY
Qty: 90 TABLET | Refills: 3 | Status: SHIPPED | OUTPATIENT
Start: 2024-03-11 | End: 2025-03-06

## 2024-03-12 ENCOUNTER — TELEPHONE (OUTPATIENT)
Dept: PRIMARY CARE CLINIC | Age: 84
End: 2024-03-12

## 2024-03-12 DIAGNOSIS — I10 HYPERTENSION, ESSENTIAL: ICD-10-CM

## 2024-03-12 RX ORDER — LOSARTAN POTASSIUM 100 MG/1
100 TABLET ORAL DAILY
Qty: 90 TABLET | Refills: 3 | Status: CANCELLED | OUTPATIENT
Start: 2024-03-12 | End: 2025-03-07

## 2024-04-04 NOTE — TELEPHONE ENCOUNTER
Patient called requesting refill on Rosuvastatin. Last ordered in September of 22. Had over stock so hasn't needed to get it refilled.  Order pended and Pharmacy verified.

## 2024-04-09 RX ORDER — ROSUVASTATIN CALCIUM 10 MG/1
TABLET, COATED ORAL
Qty: 52 TABLET | Refills: 0 | Status: SHIPPED | OUTPATIENT
Start: 2024-04-09

## 2024-07-17 LAB
ALBUMIN: 4.4 G/DL (ref 3.5–5.2)
ALK PHOSPHATASE: 98 U/L (ref 40–142)
ALT SERPL-CCNC: 14 U/L (ref 5–40)
ANION GAP SERPL CALCULATED.3IONS-SCNC: 10 MEQ/L (ref 7–16)
AST SERPL-CCNC: 21 U/L (ref 9–40)
BILIRUB SERPL-MCNC: 0.7 MG/DL
BUN BLDV-MCNC: 17 MG/DL (ref 8–23)
CALCIUM SERPL-MCNC: 9.8 MG/DL (ref 8.5–10.5)
CHLORIDE BLD-SCNC: 106 MEQ/L (ref 95–107)
CHOLESTEROL, TOTAL: 199 MG/DL
CHOLESTEROL/HDL RATIO: 3.9 RATIO
CO2: 25 MEQ/L (ref 19–31)
CREAT SERPL-MCNC: 0.91 MG/DL (ref 0.6–1.3)
EGFR IF NONAFRICAN AMERICAN: 63 ML/MIN/1.73
ESTIMATED AVERAGE GLUCOSE: 123 MG/DL
GLUCOSE: 126 MG/DL (ref 70–99)
HBA1C MFR BLD: 5.9 % (ref 4.2–5.6)
HCT VFR BLD CALC: 42.9 % (ref 34–45)
HDLC SERPL-MCNC: 51 MG/DL
HEMOGLOBIN: 13.9 G/DL (ref 11.5–15.5)
LDL CHOLESTEROL: 116 MG/DL
LDL/HDL RATIO: 2.3 RATIO
MCH RBC QN AUTO: 30.7 PG (ref 25–33)
MCHC RBC AUTO-ENTMCNC: 32.4 G/DL (ref 31–36)
MCV RBC AUTO: 94.7 FL (ref 80–99)
PDW BLD-RTO: 13.2 % (ref 11.5–15)
PLATELET # BLD: 188 K/UL (ref 130–400)
PMV BLD AUTO: 9.1 FL (ref 9.3–13)
POTASSIUM SERPL-SCNC: 4.1 MEQ/L (ref 3.5–5.4)
RBC # BLD: 4.53 M/UL (ref 3.8–5.4)
SODIUM BLD-SCNC: 141 MEQ/L (ref 133–146)
TOTAL PROTEIN: 7 G/DL (ref 6.1–8.3)
TRIGL SERPL-MCNC: 158 MG/DL
VITAMIN D 25-HYDROXY: 77 NG/ML
VLDLC SERPL CALC-MCNC: 32 MG/DL
WBC # BLD: 4.4 K/UL (ref 3.5–11)

## 2024-07-24 ENCOUNTER — OFFICE VISIT (OUTPATIENT)
Dept: PRIMARY CARE CLINIC | Age: 84
End: 2024-07-24
Payer: MEDICARE

## 2024-07-24 VITALS
OXYGEN SATURATION: 96 % | DIASTOLIC BLOOD PRESSURE: 100 MMHG | BODY MASS INDEX: 28.12 KG/M2 | HEART RATE: 74 BPM | WEIGHT: 169 LBS | SYSTOLIC BLOOD PRESSURE: 160 MMHG | TEMPERATURE: 97.3 F

## 2024-07-24 DIAGNOSIS — C44.91 BASAL CELL CARCINOMA (BCC), UNSPECIFIED SITE: ICD-10-CM

## 2024-07-24 DIAGNOSIS — I10 ESSENTIAL HYPERTENSION: Primary | ICD-10-CM

## 2024-07-24 DIAGNOSIS — L30.9 DERMATITIS: ICD-10-CM

## 2024-07-24 DIAGNOSIS — E78.49 OTHER HYPERLIPIDEMIA: ICD-10-CM

## 2024-07-24 DIAGNOSIS — M85.80 OSTEOPENIA, UNSPECIFIED LOCATION: ICD-10-CM

## 2024-07-24 DIAGNOSIS — C44.92 SQUAMOUS CELL CARCINOMA OF SKIN: ICD-10-CM

## 2024-07-24 DIAGNOSIS — R73.9 HYPERGLYCEMIA: ICD-10-CM

## 2024-07-24 PROCEDURE — 1036F TOBACCO NON-USER: CPT | Performed by: NURSE PRACTITIONER

## 2024-07-24 PROCEDURE — 3077F SYST BP >= 140 MM HG: CPT | Performed by: NURSE PRACTITIONER

## 2024-07-24 PROCEDURE — 99211 OFF/OP EST MAY X REQ PHY/QHP: CPT | Performed by: NURSE PRACTITIONER

## 2024-07-24 PROCEDURE — 1123F ACP DISCUSS/DSCN MKR DOCD: CPT | Performed by: NURSE PRACTITIONER

## 2024-07-24 PROCEDURE — 1090F PRES/ABSN URINE INCON ASSESS: CPT | Performed by: NURSE PRACTITIONER

## 2024-07-24 PROCEDURE — 99214 OFFICE O/P EST MOD 30 MIN: CPT | Performed by: NURSE PRACTITIONER

## 2024-07-24 PROCEDURE — G8417 CALC BMI ABV UP PARAM F/U: HCPCS | Performed by: NURSE PRACTITIONER

## 2024-07-24 PROCEDURE — G8399 PT W/DXA RESULTS DOCUMENT: HCPCS | Performed by: NURSE PRACTITIONER

## 2024-07-24 PROCEDURE — G8427 DOCREV CUR MEDS BY ELIG CLIN: HCPCS | Performed by: NURSE PRACTITIONER

## 2024-07-24 PROCEDURE — 3080F DIAST BP >= 90 MM HG: CPT | Performed by: NURSE PRACTITIONER

## 2024-07-24 RX ORDER — ROSUVASTATIN CALCIUM 10 MG/1
TABLET, COATED ORAL
Qty: 60 TABLET | Refills: 1 | Status: SHIPPED | OUTPATIENT
Start: 2024-07-24

## 2024-07-24 RX ORDER — MOMETASONE FUROATE 1 MG/G
CREAM TOPICAL
Qty: 1 EACH | Refills: 0 | Status: SHIPPED | OUTPATIENT
Start: 2024-07-24

## 2024-07-24 NOTE — PROGRESS NOTES
[address] [address]  Dept: [phone]    Ear Cerumen Removal Procedure Note  Indication: ear cerumen impaction    Procedure: After placing the patient's head in the appropriate position, the patient's {right/left:20134} ear canal was irrigated with *** until all cerumen was removed and the ear canal was clear.      A currette  {blank single 96819:: \"was\",\"was not\"} used for this procedure.    The patient tolerated the procedure {TOLERATED:20124}.    {JAMAR Holley-TANISHA/Dr. Rangel/Dr. Kennedy} examined the patient's ears post irrigation confirming that the canals are clear bilaterally.

## 2024-07-24 NOTE — PROGRESS NOTES
Name: Clara Quinones  : 1940         Chief Complaint:     Chief Complaint   Patient presents with    Hypertension     -the patient has a history of white coat hypertension. She is taking amlodipine 5mg QD, lostartan 100mg QD, and metoprolol tartrate 25mg QD.   -she brought in her BP readings  -average /80 at home    Hyperlipidemia     -she had labs done 24 Chol, 199, Tri 158, HDL 51 and     osteopenia     -Vit D lab on 24 shows Vit D level at 77    red itchy bumps     -red itchy bumps on her left cheek   -itchy around the neck area  -has been outside, just wants to make sure she doesn't need to go to her dermatologist   -noticed this about a month ago  -it comes and goes       History of Present Illness:      Clara Quinones is a 83 y.o.  female who presents with Hypertension (-the patient has a history of white coat hypertension. She is taking amlodipine 5mg QD, lostartan 100mg QD, and metoprolol tartrate 25mg QD. /-she brought in her BP readings/-average /80 at home), Hyperlipidemia (-she had labs done 24 Chol, 199, Tri 158, HDL 51 and ), osteopenia (-Vit D lab on 24 shows Vit D level at 77), and red itchy bumps (-red itchy bumps on her left cheek /-itchy around the neck area/-has been outside, just wants to make sure she doesn't need to go to her dermatologist /-noticed this about a month ago/-it comes and goes)      HPI    Hypertension:  Current treatment includes metoprolol tartrate 25 mg daily, amlodipine 5mg QD, and losartan 100 mg daily.  She does have a history of whitecoat hypertension.  She has been monitoring her blood pressure at home and this is averaging 120-130s/70s-80s. She did have some side effects with recent increase in losartan dose such as back pain, joint pain, fatigue, and decreased appetite but is improving overall.     Hyperlipidemia:  Current treatment includes rosuvastatin 10 mg once weekly.  2024 total cholesterol 199, triglycerides

## 2024-12-28 DIAGNOSIS — I10 HYPERTENSION, ESSENTIAL: ICD-10-CM

## 2024-12-29 RX ORDER — AMLODIPINE BESYLATE 5 MG/1
TABLET ORAL
Qty: 90 TABLET | Refills: 3 | Status: SHIPPED | OUTPATIENT
Start: 2024-12-29

## 2024-12-29 RX ORDER — METOPROLOL TARTRATE 25 MG/1
TABLET, FILM COATED ORAL
Qty: 90 TABLET | Refills: 3 | Status: SHIPPED | OUTPATIENT
Start: 2024-12-29

## 2024-12-29 RX ORDER — LOSARTAN POTASSIUM 100 MG/1
100 TABLET ORAL DAILY
Qty: 90 TABLET | Refills: 3 | Status: SHIPPED | OUTPATIENT
Start: 2024-12-29

## 2025-01-08 ENCOUNTER — OFFICE VISIT (OUTPATIENT)
Dept: PRIMARY CARE CLINIC | Age: 85
End: 2025-01-08
Payer: MEDICARE

## 2025-01-08 ENCOUNTER — HOSPITAL ENCOUNTER (OUTPATIENT)
Age: 85
Setting detail: SPECIMEN
Discharge: HOME | End: 2025-01-08
Payer: MEDICARE

## 2025-01-08 VITALS
HEART RATE: 92 BPM | WEIGHT: 170.8 LBS | BODY MASS INDEX: 28.42 KG/M2 | SYSTOLIC BLOOD PRESSURE: 144 MMHG | DIASTOLIC BLOOD PRESSURE: 80 MMHG | OXYGEN SATURATION: 97 %

## 2025-01-08 DIAGNOSIS — N30.01 ACUTE CYSTITIS WITH HEMATURIA: Primary | ICD-10-CM

## 2025-01-08 DIAGNOSIS — N30.01 ACUTE CYSTITIS WITH HEMATURIA: ICD-10-CM

## 2025-01-08 LAB
BILIRUBIN, POC: 0
BLOOD URINE, POC: ABNORMAL
CLARITY, POC: CLEAR
COLOR, POC: ABNORMAL
GLUCOSE URINE, POC: ABNORMAL MG/DL
KETONES, POC: ABNORMAL MG/DL
LEUKOCYTE EST, POC: ABNORMAL
NITRITE, POC: ABNORMAL
PH, POC: 5
PROTEIN, POC: ABNORMAL MG/DL
SPECIFIC GRAVITY, POC: 1.03
UROBILINOGEN, POC: 0.2 MG/DL

## 2025-01-08 PROCEDURE — 87088 URINE BACTERIA CULTURE: CPT

## 2025-01-08 PROCEDURE — 1123F ACP DISCUSS/DSCN MKR DOCD: CPT | Performed by: NURSE PRACTITIONER

## 2025-01-08 PROCEDURE — 1160F RVW MEDS BY RX/DR IN RCRD: CPT | Performed by: NURSE PRACTITIONER

## 2025-01-08 PROCEDURE — 87186 SC STD MICRODIL/AGAR DIL: CPT

## 2025-01-08 PROCEDURE — 81002 URINALYSIS NONAUTO W/O SCOPE: CPT | Performed by: NURSE PRACTITIONER

## 2025-01-08 PROCEDURE — 99213 OFFICE O/P EST LOW 20 MIN: CPT | Performed by: NURSE PRACTITIONER

## 2025-01-08 PROCEDURE — 1090F PRES/ABSN URINE INCON ASSESS: CPT | Performed by: NURSE PRACTITIONER

## 2025-01-08 PROCEDURE — 3077F SYST BP >= 140 MM HG: CPT | Performed by: NURSE PRACTITIONER

## 2025-01-08 PROCEDURE — G8417 CALC BMI ABV UP PARAM F/U: HCPCS | Performed by: NURSE PRACTITIONER

## 2025-01-08 PROCEDURE — 3079F DIAST BP 80-89 MM HG: CPT | Performed by: NURSE PRACTITIONER

## 2025-01-08 PROCEDURE — G8399 PT W/DXA RESULTS DOCUMENT: HCPCS | Performed by: NURSE PRACTITIONER

## 2025-01-08 PROCEDURE — G8427 DOCREV CUR MEDS BY ELIG CLIN: HCPCS | Performed by: NURSE PRACTITIONER

## 2025-01-08 PROCEDURE — 87086 URINE CULTURE/COLONY COUNT: CPT

## 2025-01-08 PROCEDURE — 1159F MED LIST DOCD IN RCRD: CPT | Performed by: NURSE PRACTITIONER

## 2025-01-08 PROCEDURE — PBSHW POCT URINALYSIS DIPSTICK: Performed by: NURSE PRACTITIONER

## 2025-01-08 PROCEDURE — 99211 OFF/OP EST MAY X REQ PHY/QHP: CPT | Performed by: NURSE PRACTITIONER

## 2025-01-08 PROCEDURE — 1036F TOBACCO NON-USER: CPT | Performed by: NURSE PRACTITIONER

## 2025-01-08 RX ORDER — CEPHALEXIN 500 MG/1
500 CAPSULE ORAL 2 TIMES DAILY
Qty: 14 CAPSULE | Refills: 0 | Status: SHIPPED | OUTPATIENT
Start: 2025-01-08 | End: 2025-01-15

## 2025-01-08 SDOH — ECONOMIC STABILITY: FOOD INSECURITY: WITHIN THE PAST 12 MONTHS, THE FOOD YOU BOUGHT JUST DIDN'T LAST AND YOU DIDN'T HAVE MONEY TO GET MORE.: NEVER TRUE

## 2025-01-08 SDOH — ECONOMIC STABILITY: FOOD INSECURITY: WITHIN THE PAST 12 MONTHS, YOU WORRIED THAT YOUR FOOD WOULD RUN OUT BEFORE YOU GOT MONEY TO BUY MORE.: NEVER TRUE

## 2025-01-08 ASSESSMENT — PATIENT HEALTH QUESTIONNAIRE - PHQ9
SUM OF ALL RESPONSES TO PHQ QUESTIONS 1-9: 0
SUM OF ALL RESPONSES TO PHQ9 QUESTIONS 1 & 2: 0
SUM OF ALL RESPONSES TO PHQ QUESTIONS 1-9: 0
SUM OF ALL RESPONSES TO PHQ QUESTIONS 1-9: 0
1. LITTLE INTEREST OR PLEASURE IN DOING THINGS: NOT AT ALL
SUM OF ALL RESPONSES TO PHQ QUESTIONS 1-9: 0
2. FEELING DOWN, DEPRESSED OR HOPELESS: NOT AT ALL

## 2025-01-08 ASSESSMENT — ENCOUNTER SYMPTOMS
SHORTNESS OF BREATH: 0
WHEEZING: 0
COUGH: 0

## 2025-01-08 NOTE — PROGRESS NOTES
2025     Clara Quinones (:  1940) is a 84 y.o. female, here for evaluation of the following medical concerns:  Chief Complaint:   Chief Complaint   Patient presents with    Urinary Tract Infection     Burning, pain, frequency, before she goes to the restroom she gets cramping. Patient states she always has back pain and she doesn't think it has anything to do with this.  Started: yesterday  No OTC meds       Clara is seen today acutely for:    UTI  - Started yesterday  - No systemic symptoms         Prior to Visit Medications    Medication Sig Taking? Authorizing Provider   cephALEXin (KEFLEX) 500 MG capsule Take 1 capsule by mouth 2 times daily for 7 days Yes Romulo Fontana APRN - CNP   amLODIPine (NORVASC) 5 MG tablet TAKE 1 TABLET EVERY DAY Yes Romulo Fontana APRN - CNP   metoprolol tartrate (LOPRESSOR) 25 MG tablet TAKE 1 TABLET EVERY DAY Yes Romulo Fontana APRN - CNP   losartan (COZAAR) 100 MG tablet TAKE 1 TABLET EVERY DAY Yes Romulo Fontana APRN - CNP   rosuvastatin (CRESTOR) 10 MG tablet Take 1 tablet by mouth twice weekly Yes Brandi Tian APRN - CNP   multivitamin (THERAGRAN) per tablet Take 1 tablet by mouth daily Yes ProviderLauren MD   aspirin 81 MG tablet Take 1 tablet by mouth Pt takes it 3 times a week Yes ProviderLauren MD   mometasone (ELOCON) 0.1 % cream Apply topically once daily to affected area  Patient not taking: Reported on 2025  Brandi Tian APRN - CNP   clotrimazole-betamethasone (LOTRISONE) 1-0.05 % cream Apply topically 2 times daily.  Patient not taking: Reported on 2025  Ever Bolanos MD   neomycin-polymyxin-dexamethasone (MAXITROL) 0.1 % ophthalmic suspension Apply 1 drop to eye as needed (Apply 1 drop to eye as needed PRN drops in ear) PRN drops in ear  Patient not taking: Reported on 2025  Ever Bolanos MD        Social History     Tobacco Use    Smoking status: Former     Current packs/day: 0.00     Average packs/day: 0.5

## 2025-01-10 LAB
MICROORGANISM SPEC CULT: ABNORMAL
SERVICE CMNT-IMP: ABNORMAL
SPECIMEN DESCRIPTION: ABNORMAL

## 2025-01-21 SDOH — HEALTH STABILITY: PHYSICAL HEALTH: ON AVERAGE, HOW MANY MINUTES DO YOU ENGAGE IN EXERCISE AT THIS LEVEL?: 20 MIN

## 2025-01-21 SDOH — HEALTH STABILITY: PHYSICAL HEALTH: ON AVERAGE, HOW MANY DAYS PER WEEK DO YOU ENGAGE IN MODERATE TO STRENUOUS EXERCISE (LIKE A BRISK WALK)?: 3 DAYS

## 2025-01-21 ASSESSMENT — PATIENT HEALTH QUESTIONNAIRE - PHQ9
SUM OF ALL RESPONSES TO PHQ9 QUESTIONS 1 & 2: 0
SUM OF ALL RESPONSES TO PHQ QUESTIONS 1-9: 0
SUM OF ALL RESPONSES TO PHQ QUESTIONS 1-9: 0
1. LITTLE INTEREST OR PLEASURE IN DOING THINGS: NOT AT ALL
SUM OF ALL RESPONSES TO PHQ QUESTIONS 1-9: 0
SUM OF ALL RESPONSES TO PHQ QUESTIONS 1-9: 0
2. FEELING DOWN, DEPRESSED OR HOPELESS: NOT AT ALL

## 2025-01-21 ASSESSMENT — LIFESTYLE VARIABLES
HOW OFTEN DURING THE LAST YEAR HAVE YOU FOUND THAT YOU WERE NOT ABLE TO STOP DRINKING ONCE YOU HAD STARTED: NEVER
HOW OFTEN DO YOU HAVE A DRINK CONTAINING ALCOHOL: 4 OR MORE TIMES A WEEK
HAS A RELATIVE, FRIEND, DOCTOR, OR ANOTHER HEALTH PROFESSIONAL EXPRESSED CONCERN ABOUT YOUR DRINKING OR SUGGESTED YOU CUT DOWN: NO
HAS A RELATIVE, FRIEND, DOCTOR, OR ANOTHER HEALTH PROFESSIONAL EXPRESSED CONCERN ABOUT YOUR DRINKING OR SUGGESTED YOU CUT DOWN: NO
HOW OFTEN DURING THE LAST YEAR HAVE YOU HAD A FEELING OF GUILT OR REMORSE AFTER DRINKING: NEVER
HOW OFTEN DURING THE LAST YEAR HAVE YOU HAD A FEELING OF GUILT OR REMORSE AFTER DRINKING: NEVER
HOW OFTEN DURING THE LAST YEAR HAVE YOU FOUND THAT YOU WERE NOT ABLE TO STOP DRINKING ONCE YOU HAD STARTED: NEVER
HOW OFTEN DURING THE LAST YEAR HAVE YOU FAILED TO DO WHAT WAS NORMALLY EXPECTED FROM YOU BECAUSE OF DRINKING: NEVER
HOW MANY STANDARD DRINKS CONTAINING ALCOHOL DO YOU HAVE ON A TYPICAL DAY: 1 OR 2
HOW MANY STANDARD DRINKS CONTAINING ALCOHOL DO YOU HAVE ON A TYPICAL DAY: 1
HAVE YOU OR SOMEONE ELSE BEEN INJURED AS A RESULT OF YOUR DRINKING: NO
HOW OFTEN DURING THE LAST YEAR HAVE YOU NEEDED AN ALCOHOLIC DRINK FIRST THING IN THE MORNING TO GET YOURSELF GOING AFTER A NIGHT OF HEAVY DRINKING: NEVER
HOW OFTEN DO YOU HAVE SIX OR MORE DRINKS ON ONE OCCASION: 1
HOW OFTEN DURING THE LAST YEAR HAVE YOU BEEN UNABLE TO REMEMBER WHAT HAPPENED THE NIGHT BEFORE BECAUSE YOU HAD BEEN DRINKING: NEVER
HOW OFTEN DURING THE LAST YEAR HAVE YOU FAILED TO DO WHAT WAS NORMALLY EXPECTED FROM YOU BECAUSE OF DRINKING: NEVER
HOW OFTEN DURING THE LAST YEAR HAVE YOU BEEN UNABLE TO REMEMBER WHAT HAPPENED THE NIGHT BEFORE BECAUSE YOU HAD BEEN DRINKING: NEVER
HOW OFTEN DURING THE LAST YEAR HAVE YOU NEEDED AN ALCOHOLIC DRINK FIRST THING IN THE MORNING TO GET YOURSELF GOING AFTER A NIGHT OF HEAVY DRINKING: NEVER
HOW OFTEN DO YOU HAVE A DRINK CONTAINING ALCOHOL: 5
HAVE YOU OR SOMEONE ELSE BEEN INJURED AS A RESULT OF YOUR DRINKING: NO

## 2025-01-22 LAB
ALT SERPL-CCNC: 15 U/L (ref 5–33)
ANION GAP SERPL CALCULATED.3IONS-SCNC: 12 MMOL/L (ref 7–16)
AST SERPL-CCNC: 18 U/L (ref 9–40)
BUN BLDV-MCNC: 14 MG/DL (ref 8–23)
CALCIUM SERPL-MCNC: 10 MG/DL (ref 8.6–10.5)
CHLORIDE BLD-SCNC: 107 MMOL/L (ref 96–107)
CHOLESTEROL, TOTAL: 169 MG/DL (ref 100–199)
CHOLESTEROL/HDL RATIO: 3.4 (ref 2–4.5)
CO2: 26 MMOL/L (ref 18–32)
CREAT SERPL-MCNC: 0.87 MG/DL (ref 0.51–1.15)
EGFR IF NONAFRICAN AMERICAN: 66 ML/MIN/1.73M2
ESTIMATED AVERAGE GLUCOSE: 123 MG/DL
GLUCOSE: 124 MG/DL (ref 70–100)
HBA1C MFR BLD: 5.9 %
HCT VFR BLD CALC: 41.2 % (ref 35–47)
HDLC SERPL-MCNC: 50 MG/DL
HEMOGLOBIN: 13.8 G/DL (ref 11.9–16)
LDL CHOLESTEROL: 87 MG/DL
LDL/HDL RATIO: 1.7
MCH RBC QN AUTO: 30.9 PG (ref 26–33)
MCHC RBC AUTO-ENTMCNC: 33.5 G/DL (ref 32–35)
MCV RBC AUTO: 92 FL (ref 75–100)
PDW BLD-RTO: 13.1 % (ref 11.2–14.8)
PLATELET # BLD: 202 THOUS/CMM (ref 140–440)
POTASSIUM SERPL-SCNC: 4.1 MMOL/L (ref 3.5–5.4)
RBC # BLD: 4.46 MILL/CMM (ref 3.8–5.2)
SODIUM BLD-SCNC: 145 MMOL/L (ref 135–148)
TRIGL SERPL-MCNC: 160 MG/DL (ref 20–149)
VITAMIN D 25-HYDROXY: 64.9 NG/ML (ref 30–100)
VLDLC SERPL CALC-MCNC: 32 MG/DL
WBC # BLD: 6.3 THDS/CMM (ref 3.6–11)

## 2025-01-28 ENCOUNTER — OFFICE VISIT (OUTPATIENT)
Dept: PRIMARY CARE CLINIC | Age: 85
End: 2025-01-28
Payer: MEDICARE

## 2025-01-28 VITALS
WEIGHT: 172 LBS | SYSTOLIC BLOOD PRESSURE: 170 MMHG | HEART RATE: 67 BPM | BODY MASS INDEX: 28.62 KG/M2 | DIASTOLIC BLOOD PRESSURE: 90 MMHG | OXYGEN SATURATION: 96 % | TEMPERATURE: 96.8 F

## 2025-01-28 DIAGNOSIS — R73.9 HYPERGLYCEMIA: ICD-10-CM

## 2025-01-28 DIAGNOSIS — E78.49 OTHER HYPERLIPIDEMIA: ICD-10-CM

## 2025-01-28 DIAGNOSIS — Z00.00 MEDICARE ANNUAL WELLNESS VISIT, SUBSEQUENT: Primary | ICD-10-CM

## 2025-01-28 DIAGNOSIS — I10 ESSENTIAL HYPERTENSION: ICD-10-CM

## 2025-01-28 PROCEDURE — 3077F SYST BP >= 140 MM HG: CPT | Performed by: NURSE PRACTITIONER

## 2025-01-28 PROCEDURE — 1123F ACP DISCUSS/DSCN MKR DOCD: CPT | Performed by: NURSE PRACTITIONER

## 2025-01-28 PROCEDURE — 99211 OFF/OP EST MAY X REQ PHY/QHP: CPT | Performed by: NURSE PRACTITIONER

## 2025-01-28 PROCEDURE — G0439 PPPS, SUBSEQ VISIT: HCPCS | Performed by: NURSE PRACTITIONER

## 2025-01-28 PROCEDURE — 1159F MED LIST DOCD IN RCRD: CPT | Performed by: NURSE PRACTITIONER

## 2025-01-28 PROCEDURE — 3080F DIAST BP >= 90 MM HG: CPT | Performed by: NURSE PRACTITIONER

## 2025-01-28 ASSESSMENT — ENCOUNTER SYMPTOMS: SHORTNESS OF BREATH: 0

## 2025-01-28 NOTE — PROGRESS NOTES
Medicare Annual Wellness Visit    Clara Quinones is here for Medicare AWV (-mini cog 5/-pt brought in BP log due to having white coat syndrome)       Subjective     Wellness:  She admits well balanced diet. For exercise she notes walking at the [place] and completing yoga at home. She admits routine eye exams. She admits routine dental exams. She is vaccinated for covid, most recent vaccine 2021. She is UTD tetanus vaccine. She is UTD pneumococcal vaccine. She is UTD shingles vaccine. She is not UTD RSV vaccine. She is UTD influenza vaccine.  Smoking status: \"way back\". Most recent DEXA scan 3/2023 showing osteopenia. She is supplementing with vitamin D. Has experienced side effects with calcium in the past therefore not supplementing with this.  History of basal cell skin cancer, following with Dr. Shaver (dermatology).     White Coat HTN:  Patient has well-known history of whitecoat hypertension.  Currently taking amlodipine 5 mg daily, metoprolol tartrate 25 mg daily, and losartan 100 mg daily.  Patient does bring in at home records averaging 120s/80s.     Ear Itching:  Uses cortisporin otic PRN for hx of recurrent ear infection and ear itching. Works well.     Patient's complete Health Risk Assessment and screening values have been reviewed and are found in Flowsheets. The following problems were reviewed today and where indicated follow up appointments were made and/or referrals ordered.    Positive Risk Factor Screenings with Interventions:                    Safety:  Do you have any tripping hazards - loose or unsecured carpets or rugs?: (!) Yes  Interventions:  Home safety tips provided          Review of Systems   Respiratory:  Negative for shortness of breath.    Cardiovascular:  Negative for chest pain.   Neurological:  Negative for dizziness and light-headedness.           Objective   Vitals:    01/28/25 0959   BP: (!) 170/90   Pulse: 67   Temp: 96.8 °F (36 °C)   SpO2: 96%   Weight: 78 kg (172 lb)      Body

## 2025-02-04 ENCOUNTER — HOSPITAL ENCOUNTER (OUTPATIENT)
Age: 85
Discharge: HOME | End: 2025-02-04
Payer: MEDICARE

## 2025-02-04 DIAGNOSIS — I10 ESSENTIAL HYPERTENSION: ICD-10-CM

## 2025-02-04 LAB
EKG ATRIAL RATE: 71 BPM
EKG P AXIS: 55 DEGREES
EKG P-R INTERVAL: 190 MS
EKG Q-T INTERVAL: 394 MS
EKG QRS DURATION: 84 MS
EKG QTC CALCULATION (BAZETT): 428 MS
EKG R AXIS: -23 DEGREES
EKG T AXIS: 57 DEGREES
EKG VENTRICULAR RATE: 71 BPM

## 2025-02-04 PROCEDURE — 93010 ELECTROCARDIOGRAM REPORT: CPT | Performed by: INTERNAL MEDICINE

## 2025-02-04 PROCEDURE — 93005 ELECTROCARDIOGRAM TRACING: CPT

## 2025-05-14 RX ORDER — ROSUVASTATIN CALCIUM 10 MG/1
TABLET, COATED ORAL
Qty: 26 TABLET | Refills: 3 | Status: SHIPPED | OUTPATIENT
Start: 2025-05-14

## 2025-08-21 LAB
ANION GAP SERPL CALCULATED.3IONS-SCNC: 13 MMOL/L (ref 7–16)
BUN BLDV-MCNC: 17 MG/DL (ref 8–23)
CALCIUM SERPL-MCNC: 9.7 MG/DL (ref 8.6–10.5)
CHLORIDE BLD-SCNC: 107 MMOL/L (ref 96–107)
CHOLESTEROL, TOTAL: 182 MG/DL (ref 100–199)
CHOLESTEROL/HDL RATIO: 3.6 (ref 2–4.5)
CO2: 25 MMOL/L (ref 18–32)
CREAT SERPL-MCNC: 0.86 MG/DL (ref 0.51–1.15)
EGFR IF NONAFRICAN AMERICAN: 67 ML/MIN/1.73M2
ESTIMATED AVERAGE GLUCOSE: 120 MG/DL
GLUCOSE: 128 MG/DL (ref 70–100)
HBA1C MFR BLD: 5.8 %
HDLC SERPL-MCNC: 50 MG/DL
LDL CHOLESTEROL: 99 MG/DL
LDL/HDL RATIO: 2
POTASSIUM SERPL-SCNC: 4.2 MMOL/L (ref 3.5–5.4)
SODIUM BLD-SCNC: 145 MMOL/L (ref 135–148)
TRIGL SERPL-MCNC: 163 MG/DL (ref 20–149)
VLDLC SERPL CALC-MCNC: 33 MG/DL

## 2025-08-26 ENCOUNTER — OFFICE VISIT (OUTPATIENT)
Dept: PRIMARY CARE CLINIC | Age: 85
End: 2025-08-26
Payer: MEDICARE

## 2025-08-26 VITALS
DIASTOLIC BLOOD PRESSURE: 90 MMHG | TEMPERATURE: 98.2 F | SYSTOLIC BLOOD PRESSURE: 148 MMHG | OXYGEN SATURATION: 98 % | BODY MASS INDEX: 27.79 KG/M2 | HEART RATE: 96 BPM | WEIGHT: 167 LBS

## 2025-08-26 DIAGNOSIS — I10 ESSENTIAL HYPERTENSION: Primary | ICD-10-CM

## 2025-08-26 DIAGNOSIS — E78.5 HYPERLIPIDEMIA, UNSPECIFIED HYPERLIPIDEMIA TYPE: ICD-10-CM

## 2025-08-26 PROCEDURE — 99211 OFF/OP EST MAY X REQ PHY/QHP: CPT | Performed by: NURSE PRACTITIONER

## 2025-08-26 PROCEDURE — 1159F MED LIST DOCD IN RCRD: CPT | Performed by: NURSE PRACTITIONER

## 2025-08-26 PROCEDURE — 99214 OFFICE O/P EST MOD 30 MIN: CPT | Performed by: NURSE PRACTITIONER

## 2025-08-26 PROCEDURE — 1036F TOBACCO NON-USER: CPT | Performed by: NURSE PRACTITIONER

## 2025-08-26 PROCEDURE — 1123F ACP DISCUSS/DSCN MKR DOCD: CPT | Performed by: NURSE PRACTITIONER

## 2025-08-26 PROCEDURE — G8417 CALC BMI ABV UP PARAM F/U: HCPCS | Performed by: NURSE PRACTITIONER

## 2025-08-26 PROCEDURE — 1090F PRES/ABSN URINE INCON ASSESS: CPT | Performed by: NURSE PRACTITIONER

## 2025-08-26 PROCEDURE — G2211 COMPLEX E/M VISIT ADD ON: HCPCS | Performed by: NURSE PRACTITIONER

## 2025-08-26 PROCEDURE — G8399 PT W/DXA RESULTS DOCUMENT: HCPCS | Performed by: NURSE PRACTITIONER

## 2025-08-26 PROCEDURE — G8427 DOCREV CUR MEDS BY ELIG CLIN: HCPCS | Performed by: NURSE PRACTITIONER

## 2025-08-26 PROCEDURE — 3080F DIAST BP >= 90 MM HG: CPT | Performed by: NURSE PRACTITIONER

## 2025-08-26 PROCEDURE — 3077F SYST BP >= 140 MM HG: CPT | Performed by: NURSE PRACTITIONER

## 2025-08-26 RX ORDER — METOPROLOL SUCCINATE 50 MG/1
50 TABLET, EXTENDED RELEASE ORAL DAILY
Qty: 30 TABLET | Refills: 5 | Status: SHIPPED | OUTPATIENT
Start: 2025-08-26 | End: 2025-08-26 | Stop reason: CLARIF

## 2025-08-26 RX ORDER — METOPROLOL TARTRATE 25 MG/1
25 TABLET, FILM COATED ORAL 2 TIMES DAILY
Qty: 90 TABLET | Refills: 3
Start: 2025-08-26

## 2025-08-26 RX ORDER — NEOMYCIN POLYMYXIN B SULFATES AND DEXAMETHASONE 3.5; 10000; 1 MG/ML; [USP'U]/ML; MG/ML
SUSPENSION/ DROPS OPHTHALMIC
Qty: 5 ML | Refills: 1 | Status: SHIPPED | OUTPATIENT
Start: 2025-08-26